# Patient Record
Sex: FEMALE | Race: WHITE | NOT HISPANIC OR LATINO
[De-identification: names, ages, dates, MRNs, and addresses within clinical notes are randomized per-mention and may not be internally consistent; named-entity substitution may affect disease eponyms.]

---

## 2017-10-01 ENCOUNTER — TRANSCRIPTION ENCOUNTER (OUTPATIENT)
Age: 52
End: 2017-10-01

## 2018-02-15 ENCOUNTER — TRANSCRIPTION ENCOUNTER (OUTPATIENT)
Age: 53
End: 2018-02-15

## 2018-02-17 ENCOUNTER — TRANSCRIPTION ENCOUNTER (OUTPATIENT)
Age: 53
End: 2018-02-17

## 2018-05-16 ENCOUNTER — TRANSCRIPTION ENCOUNTER (OUTPATIENT)
Age: 53
End: 2018-05-16

## 2018-06-13 ENCOUNTER — APPOINTMENT (OUTPATIENT)
Dept: HEMATOLOGY ONCOLOGY | Facility: CLINIC | Age: 53
End: 2018-06-13
Payer: COMMERCIAL

## 2018-06-13 VITALS
TEMPERATURE: 98.8 F | DIASTOLIC BLOOD PRESSURE: 81 MMHG | OXYGEN SATURATION: 98 % | RESPIRATION RATE: 16 BRPM | BODY MASS INDEX: 31.36 KG/M2 | SYSTOLIC BLOOD PRESSURE: 121 MMHG | HEIGHT: 71.46 IN | HEART RATE: 83 BPM | WEIGHT: 228.99 LBS

## 2018-06-13 DIAGNOSIS — Z87.891 PERSONAL HISTORY OF NICOTINE DEPENDENCE: ICD-10-CM

## 2018-06-13 DIAGNOSIS — Z80.7 FAMILY HISTORY OF OTHER MALIGNANT NEOPLASMS OF LYMPHOID, HEMATOPOIETIC AND RELATED TISSUES: ICD-10-CM

## 2018-06-13 DIAGNOSIS — Z86.2 PERSONAL HISTORY OF DISEASES OF THE BLOOD AND BLOOD-FORMING ORGANS AND CERTAIN DISORDERS INVOLVING THE IMMUNE MECHANISM: ICD-10-CM

## 2018-06-13 DIAGNOSIS — Z78.9 OTHER SPECIFIED HEALTH STATUS: ICD-10-CM

## 2018-06-13 DIAGNOSIS — Z87.19 PERSONAL HISTORY OF OTHER DISEASES OF THE DIGESTIVE SYSTEM: ICD-10-CM

## 2018-06-13 PROCEDURE — 99245 OFF/OP CONSLTJ NEW/EST HI 55: CPT

## 2018-06-21 ENCOUNTER — OUTPATIENT (OUTPATIENT)
Dept: OUTPATIENT SERVICES | Facility: HOSPITAL | Age: 53
LOS: 1 days | End: 2018-06-21
Payer: COMMERCIAL

## 2018-06-21 PROCEDURE — 78815 PET IMAGE W/CT SKULL-THIGH: CPT

## 2018-06-21 PROCEDURE — 82962 GLUCOSE BLOOD TEST: CPT

## 2018-06-21 PROCEDURE — A9552: CPT

## 2018-06-21 PROCEDURE — 78815 PET IMAGE W/CT SKULL-THIGH: CPT | Mod: 26

## 2018-07-11 ENCOUNTER — APPOINTMENT (OUTPATIENT)
Dept: HEMATOLOGY ONCOLOGY | Facility: CLINIC | Age: 53
End: 2018-07-11
Payer: COMMERCIAL

## 2018-07-11 VITALS
DIASTOLIC BLOOD PRESSURE: 80 MMHG | HEART RATE: 62 BPM | TEMPERATURE: 98.5 F | RESPIRATION RATE: 20 BRPM | BODY MASS INDEX: 31.08 KG/M2 | WEIGHT: 226.99 LBS | OXYGEN SATURATION: 99 % | SYSTOLIC BLOOD PRESSURE: 121 MMHG | HEIGHT: 71.46 IN

## 2018-07-11 PROCEDURE — 99214 OFFICE O/P EST MOD 30 MIN: CPT

## 2018-08-29 ENCOUNTER — APPOINTMENT (OUTPATIENT)
Dept: HEMATOLOGY ONCOLOGY | Facility: CLINIC | Age: 53
End: 2018-08-29
Payer: COMMERCIAL

## 2018-08-29 VITALS
WEIGHT: 223.99 LBS | HEART RATE: 62 BPM | TEMPERATURE: 98.2 F | SYSTOLIC BLOOD PRESSURE: 110 MMHG | HEIGHT: 71.46 IN | RESPIRATION RATE: 20 BRPM | DIASTOLIC BLOOD PRESSURE: 74 MMHG | OXYGEN SATURATION: 99 % | BODY MASS INDEX: 30.67 KG/M2

## 2018-08-29 PROCEDURE — 99214 OFFICE O/P EST MOD 30 MIN: CPT

## 2018-10-23 ENCOUNTER — TRANSCRIPTION ENCOUNTER (OUTPATIENT)
Age: 53
End: 2018-10-23

## 2018-10-24 ENCOUNTER — APPOINTMENT (OUTPATIENT)
Dept: HEMATOLOGY ONCOLOGY | Facility: CLINIC | Age: 53
End: 2018-10-24
Payer: COMMERCIAL

## 2018-10-24 ENCOUNTER — RESULT REVIEW (OUTPATIENT)
Age: 53
End: 2018-10-24

## 2018-10-24 VITALS
WEIGHT: 226 LBS | HEIGHT: 71.46 IN | OXYGEN SATURATION: 100 % | HEART RATE: 71 BPM | RESPIRATION RATE: 16 BRPM | BODY MASS INDEX: 30.95 KG/M2 | DIASTOLIC BLOOD PRESSURE: 66 MMHG | SYSTOLIC BLOOD PRESSURE: 106 MMHG | TEMPERATURE: 98.6 F

## 2018-10-24 PROCEDURE — 99214 OFFICE O/P EST MOD 30 MIN: CPT

## 2018-10-24 RX ORDER — ERGOCALCIFEROL 1.25 MG/1
1.25 MG CAPSULE ORAL
Refills: 0 | Status: COMPLETED | COMMUNITY
End: 2018-10-24

## 2018-12-23 ENCOUNTER — RX RENEWAL (OUTPATIENT)
Age: 53
End: 2018-12-23

## 2019-01-02 ENCOUNTER — APPOINTMENT (OUTPATIENT)
Dept: HEMATOLOGY ONCOLOGY | Facility: CLINIC | Age: 54
End: 2019-01-02
Payer: COMMERCIAL

## 2019-01-02 ENCOUNTER — RESULT REVIEW (OUTPATIENT)
Age: 54
End: 2019-01-02

## 2019-01-02 VITALS
TEMPERATURE: 98.1 F | RESPIRATION RATE: 16 BRPM | HEART RATE: 69 BPM | OXYGEN SATURATION: 98 % | SYSTOLIC BLOOD PRESSURE: 134 MMHG | HEIGHT: 71.46 IN | DIASTOLIC BLOOD PRESSURE: 68 MMHG | WEIGHT: 235 LBS | BODY MASS INDEX: 32.18 KG/M2

## 2019-01-02 PROCEDURE — 99214 OFFICE O/P EST MOD 30 MIN: CPT

## 2019-01-02 NOTE — REVIEW OF SYSTEMS
[Muscle Pain] : muscle pain [Negative] : Allergic/Immunologic [Fatigue] : no fatigue [FreeTextEntry9] : patient is s/p fall at home

## 2019-01-02 NOTE — REASON FOR VISIT
[Follow-Up Visit] : a follow-up visit for [Lymphoma] : lymphoma [FreeTextEntry2] : Waldenstrom Macroglobulinemia, iron deficiency anemia

## 2019-01-02 NOTE — CONSULT LETTER
[Dear  ___] : Dear  [unfilled], [Consult Letter:] : I had the pleasure of evaluating your patient, [unfilled]. [Please see my note below.] : Please see my note below. [Sincerely,] : Sincerely, [FreeTextEntry3] : Adilia Mireles MD\par [facility] [place] at [facility]\par

## 2019-01-02 NOTE — HISTORY OF PRESENT ILLNESS
[de-identified] : 52 year old female who is referred by Dr.Tobe Sanchez for initial consultation for newly diagnosed Waldenstrom macroglobulinemia.  \par She developed intermittent chest pressure and underwent laboratory testing which showed positive immunofixation for double clone of IgM.  \par She does not have the records but saw  underwent  .  She believes IgM is in the 4623-7740 range and the serum viscosity is slightly elevated.\par She denies weight loss, gum bleeding but has h/o chronic bruising, no neuropathy, imbalance or difficulty concentrating.  \par She has observed changes with her vision but didn’t see ophthalmologist.  \par She had h/o gastric bypass and has significant iron deficiency.   [FreeTextEntry1] : s/p IV iron [de-identified] : Patient presents for WM, KALEIGH, and lymphoma.  Patient states she is feeling well.

## 2019-01-02 NOTE — ASSESSMENT
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  \par She presented to PCP with intermitted chest discomfort and was found to have elevated alkaline phosphatase and it was followed by immunofixation which showed biclonal IgM-kappa.  \par Review of labs in [facility] showed elevated ALP dating back to August 2006 with value between 120 to 250 with normal bilirubin. 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  \par last bone density \par T-score -2.5\par Risk factors: postmenopausal, Waldenstrom macroglobulinemia. \par Recommended:\par 1. Vitamin D\par 2. Calcium supplement 500mg\par 3. Weight bearing exercises\par 4. Prolia 6/18 - continue q 6 months. \par \par vit D level- October 2018 \par - start 5 k vit D daily \par \par

## 2019-01-16 ENCOUNTER — TRANSCRIPTION ENCOUNTER (OUTPATIENT)
Age: 54
End: 2019-01-16

## 2019-02-21 ENCOUNTER — RECORD ABSTRACTING (OUTPATIENT)
Age: 54
End: 2019-02-21

## 2019-02-21 DIAGNOSIS — K22.4 DYSKINESIA OF ESOPHAGUS: ICD-10-CM

## 2019-02-21 DIAGNOSIS — Z80.0 FAMILY HISTORY OF MALIGNANT NEOPLASM OF DIGESTIVE ORGANS: ICD-10-CM

## 2019-02-21 DIAGNOSIS — Z83.42 FAMILY HISTORY OF FAMILIAL HYPERCHOLESTEROLEMIA: ICD-10-CM

## 2019-02-21 DIAGNOSIS — E27.9 DISORDER OF ADRENAL GLAND, UNSPECIFIED: ICD-10-CM

## 2019-02-21 DIAGNOSIS — Z82.61 FAMILY HISTORY OF ARTHRITIS: ICD-10-CM

## 2019-02-21 DIAGNOSIS — Z87.11 PERSONAL HISTORY OF PEPTIC ULCER DISEASE: ICD-10-CM

## 2019-02-21 DIAGNOSIS — Z87.19 PERSONAL HISTORY OF OTHER DISEASES OF THE DIGESTIVE SYSTEM: ICD-10-CM

## 2019-02-21 DIAGNOSIS — Z85.72 PERSONAL HISTORY OF NON-HODGKIN LYMPHOMAS: ICD-10-CM

## 2019-02-21 DIAGNOSIS — R74.8 ABNORMAL LEVELS OF OTHER SERUM ENZYMES: ICD-10-CM

## 2019-02-21 DIAGNOSIS — Z82.49 FAMILY HISTORY OF ISCHEMIC HEART DISEASE AND OTHER DISEASES OF THE CIRCULATORY SYSTEM: ICD-10-CM

## 2019-02-21 DIAGNOSIS — Z87.898 PERSONAL HISTORY OF OTHER SPECIFIED CONDITIONS: ICD-10-CM

## 2019-02-21 DIAGNOSIS — R93.89 ABNORMAL FINDINGS ON DIAGNOSTIC IMAGING OF OTHER SPECIFIED BODY STRUCTURES: ICD-10-CM

## 2019-02-21 DIAGNOSIS — Z83.3 FAMILY HISTORY OF DIABETES MELLITUS: ICD-10-CM

## 2019-02-21 LAB — CYTOLOGY CVX/VAG DOC THIN PREP: NORMAL

## 2019-03-04 ENCOUNTER — TRANSCRIPTION ENCOUNTER (OUTPATIENT)
Age: 54
End: 2019-03-04

## 2019-03-06 ENCOUNTER — RESULT REVIEW (OUTPATIENT)
Age: 54
End: 2019-03-06

## 2019-03-06 ENCOUNTER — APPOINTMENT (OUTPATIENT)
Dept: HEMATOLOGY ONCOLOGY | Facility: CLINIC | Age: 54
End: 2019-03-06
Payer: COMMERCIAL

## 2019-03-06 VITALS
BODY MASS INDEX: 33.41 KG/M2 | WEIGHT: 244 LBS | OXYGEN SATURATION: 98 % | HEIGHT: 71.46 IN | SYSTOLIC BLOOD PRESSURE: 124 MMHG | RESPIRATION RATE: 18 BRPM | HEART RATE: 71 BPM | DIASTOLIC BLOOD PRESSURE: 81 MMHG | TEMPERATURE: 98 F

## 2019-03-06 PROCEDURE — 99214 OFFICE O/P EST MOD 30 MIN: CPT

## 2019-03-06 NOTE — CONSULT LETTER
[Dear  ___] : Dear  [unfilled], [Consult Letter:] : I had the pleasure of evaluating your patient, [unfilled]. [Please see my note below.] : Please see my note below. [Sincerely,] : Sincerely, [FreeTextEntry3] : Adilia Mireles MD\par [facility] [place] at [facility]\par

## 2019-03-06 NOTE — HISTORY OF PRESENT ILLNESS
[de-identified] : 52 year old female who is referred by Dr.Tobe Sanchez for initial consultation for newly diagnosed Waldenstrom macroglobulinemia.  \par She developed intermittent chest pressure and underwent laboratory testing which showed positive immunofixation for double clone of IgM.  \par She does not have the records but saw  underwent  .  She believes IgM is in the 6341-8439 range and the serum viscosity is slightly elevated.\par She denies weight loss, gum bleeding but has h/o chronic bruising, no neuropathy, imbalance or difficulty concentrating.  \par She has observed changes with her vision but didn’t see ophthalmologist.  \par She had h/o gastric bypass and has significant iron deficiency.   [FreeTextEntry1] : s/p IV iron [de-identified] : Patient presents for WM, KALEIGH, and lymphoma.  Patient states she is feeling well.

## 2019-03-06 NOTE — ASSESSMENT
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  \par She presented to PCP with intermitted chest discomfort and was found to have elevated alkaline phosphatase and it was followed by immunofixation which showed biclonal IgM-kappa.  \par Review of labs in [facility] showed elevated ALP dating back to August 2006 with value between 120 to 250 with normal bilirubin. \par \par Patient feels well with no symptoms of hyperviscosity\par \par - no HA, imbalance, difficulty with concentration, neuropathy, dyspnea, vertigo, blurry vision, diarrhea\par - s/p IV iron x 4 - Injectafer -ferritin stable above 100\par - PETCT reviewed - no evidence of sarcoid or lymphoma \par - bone marrow results reviewed with patient - 30% BM involvement with MYD 88 mutation present\par - d/w patient at this point she has no indication to start treatment given guidelines of LISA - no B symptoms, hyperviscosity, adenopathy, hepatosplenomegaly, neuropathy, pancytopenia.\par - will monitor counts q 2-3 months \par - Ophthalmology evaluation- done.\par - Shigrix vaccine orders\par \par \par S/p gastric bypass\par - s/p IV iron\par - borderline  Zinc level\par \par Osteoporosis - 2.5.  \par last bone density \par T-score -2.5\par Risk factors: postmenopausal, Waldenstrom macroglobulinemia. \par Recommended:\par 1. Vitamin D\par 2. Calcium supplement 500mg\par 3. Weight bearing exercises\par 4. Prolia 6/18, 12/18 - continue q 6 months. \par \par vit D level- October 2018 \par - start 50 k vit D weekly  \par \par

## 2019-03-07 ENCOUNTER — APPOINTMENT (OUTPATIENT)
Dept: INTERNAL MEDICINE | Facility: CLINIC | Age: 54
End: 2019-03-07
Payer: COMMERCIAL

## 2019-03-07 ENCOUNTER — NON-APPOINTMENT (OUTPATIENT)
Age: 54
End: 2019-03-07

## 2019-03-07 VITALS
SYSTOLIC BLOOD PRESSURE: 120 MMHG | TEMPERATURE: 98.6 F | HEART RATE: 76 BPM | BODY MASS INDEX: 33.88 KG/M2 | DIASTOLIC BLOOD PRESSURE: 78 MMHG | HEIGHT: 71 IN | WEIGHT: 242 LBS | OXYGEN SATURATION: 99 %

## 2019-03-07 DIAGNOSIS — Z00.00 ENCOUNTER FOR GENERAL ADULT MEDICAL EXAMINATION W/OUT ABNORMAL FINDINGS: ICD-10-CM

## 2019-03-07 PROCEDURE — 90732 PPSV23 VACC 2 YRS+ SUBQ/IM: CPT

## 2019-03-07 PROCEDURE — 93000 ELECTROCARDIOGRAM COMPLETE: CPT

## 2019-03-07 PROCEDURE — 99396 PREV VISIT EST AGE 40-64: CPT | Mod: 25

## 2019-03-07 PROCEDURE — G0009: CPT

## 2019-03-07 PROCEDURE — 36415 COLL VENOUS BLD VENIPUNCTURE: CPT

## 2019-03-11 PROBLEM — Z00.00 ENCOUNTER FOR PREVENTIVE HEALTH EXAMINATION: Status: ACTIVE | Noted: 2018-06-13

## 2019-03-11 NOTE — HISTORY OF PRESENT ILLNESS
[FreeTextEntry1] : annual  [de-identified] : Patient is here for annual exam. She is being followed by heme/onc for lymphoma. Patient is currently stable health and happy. She would like to go back on [facility] for weight loss.

## 2019-03-11 NOTE — HISTORY OF PRESENT ILLNESS
[FreeTextEntry1] : annual  [de-identified] : Patient is here for annual exam. She is being followed by heme/onc for lymphoma. Patient is currently stable health and happy. She would like to go back on [facility] for weight loss.

## 2019-03-14 ENCOUNTER — MEDICATION RENEWAL (OUTPATIENT)
Age: 54
End: 2019-03-14

## 2019-03-18 LAB
BASOPHILS # BLD AUTO: 0.07 K/UL
BASOPHILS NFR BLD AUTO: 0.9 %
CHOLEST SERPL-MCNC: 169 MG/DL
CHOLEST/HDLC SERPL: 2.4 RATIO
EOSINOPHIL # BLD AUTO: 0.12 K/UL
EOSINOPHIL NFR BLD AUTO: 1.5 %
HBA1C MFR BLD HPLC: 5.3 %
HCT VFR BLD CALC: 47.2 %
HDLC SERPL-MCNC: 71 MG/DL
HGB BLD-MCNC: 13.7 G/DL
IMM GRANULOCYTES NFR BLD AUTO: 0.2 %
LDLC SERPL CALC-MCNC: 83 MG/DL
LYMPHOCYTES # BLD AUTO: 2.84 K/UL
LYMPHOCYTES NFR BLD AUTO: 34.5 %
MAN DIFF?: NORMAL
MCHC RBC-ENTMCNC: 29 GM/DL
MCHC RBC-ENTMCNC: 29.3 PG
MCV RBC AUTO: 100.9 FL
MONOCYTES # BLD AUTO: 0.67 K/UL
MONOCYTES NFR BLD AUTO: 8.2 %
NEUTROPHILS # BLD AUTO: 4.5 K/UL
NEUTROPHILS NFR BLD AUTO: 54.7 %
PLATELET # BLD AUTO: 348 K/UL
RBC # BLD: 4.68 M/UL
RBC # FLD: 14.2 %
T4 SERPL-MCNC: 5.9 UG/DL
TRIGL SERPL-MCNC: 74 MG/DL
TSH SERPL-ACNC: 1.38 UIU/ML
VIT B12 SERPL-MCNC: 479 PG/ML
WBC # FLD AUTO: 8.22 K/UL

## 2019-05-30 ENCOUNTER — RESULT REVIEW (OUTPATIENT)
Age: 54
End: 2019-05-30

## 2019-05-30 ENCOUNTER — APPOINTMENT (OUTPATIENT)
Dept: HEMATOLOGY ONCOLOGY | Facility: CLINIC | Age: 54
End: 2019-05-30
Payer: COMMERCIAL

## 2019-05-30 VITALS
BODY MASS INDEX: 31.64 KG/M2 | TEMPERATURE: 97.9 F | WEIGHT: 226 LBS | DIASTOLIC BLOOD PRESSURE: 70 MMHG | OXYGEN SATURATION: 97 % | SYSTOLIC BLOOD PRESSURE: 106 MMHG | HEART RATE: 72 BPM | HEIGHT: 71 IN | RESPIRATION RATE: 18 BRPM

## 2019-05-30 PROCEDURE — 99214 OFFICE O/P EST MOD 30 MIN: CPT

## 2019-06-01 NOTE — CONSULT LETTER
[FreeTextEntry3] : Adilia Mireles MD\par [facility] [place] at [facility]\par

## 2019-06-01 NOTE — ASSESSMENT
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  \par She presented to PCP with intermitted chest discomfort and was found to have elevated alkaline phosphatase and it was followed by immunofixation which showed biclonal IgM-kappa.  \par Review of labs in [facility] showed elevated ALP dating back to August 2006 with value between 120 to 250 with normal bilirubin. \par \par Recent increase in fatigue, HA, recurrence of chest pressure. \par Neuropathy - bottom of the feet\par Repeat viscosity, IgM, iron. \par Patient might require initiation of treatment - bendamustine followed by rituximab\par - bone marrow results reviewed with patient - 30% BM involvement with MYD 88 mutation present\par \par \par \par \par S/p gastric bypass\par - s/p IV iron\par - borderline  Zinc level\par \par Osteoporosis - 2.5.  \par last bone density \par T-score -2.5\par Risk factors: postmenopausal, Waldenstrom macroglobulinemia. \par Recommended:\par 1. Vitamin D\par 2. Calcium supplement 500mg\par 3. Weight bearing exercises\par 4. Prolia 6/18, 12/18 - continue q 6 months. \par \par vit D level- October 2018 \par - start 50 k vit D weekly  \par \par

## 2019-06-01 NOTE — HISTORY OF PRESENT ILLNESS
[de-identified] : 52 year old female who is referred by Dr.Tobe Sanchez for initial consultation for newly diagnosed Waldenstrom macroglobulinemia.  \par She developed intermittent chest pressure and underwent laboratory testing which showed positive immunofixation for double clone of IgM.  \par She does not have the records but saw  underwent  .  She believes IgM is in the 6491-0816 range and the serum viscosity is slightly elevated.\par She denies weight loss, gum bleeding but has h/o chronic bruising, no neuropathy, imbalance or difficulty concentrating.  \par She has observed changes with her vision but didn’t see ophthalmologist.  \par She had h/o gastric bypass and has significant iron deficiency.   [FreeTextEntry1] : s/p IV iron [de-identified] : Patient presents for WM, KALEIGH, and lymphoma.  Patient states she is having increased fatigue, where she needs to lay down when she experiences these episodes, also having some associated weakness.

## 2019-06-04 ENCOUNTER — APPOINTMENT (OUTPATIENT)
Dept: HEMATOLOGY ONCOLOGY | Facility: CLINIC | Age: 54
End: 2019-06-04
Payer: COMMERCIAL

## 2019-06-11 ENCOUNTER — OUTPATIENT (OUTPATIENT)
Dept: OUTPATIENT SERVICES | Facility: HOSPITAL | Age: 54
LOS: 1 days | End: 2019-06-11
Payer: COMMERCIAL

## 2019-06-11 PROCEDURE — 82962 GLUCOSE BLOOD TEST: CPT

## 2019-06-11 PROCEDURE — 78815 PET IMAGE W/CT SKULL-THIGH: CPT | Mod: 26

## 2019-06-11 PROCEDURE — 78815 PET IMAGE W/CT SKULL-THIGH: CPT

## 2019-06-11 PROCEDURE — A9552: CPT

## 2019-06-26 ENCOUNTER — RESULT REVIEW (OUTPATIENT)
Age: 54
End: 2019-06-26

## 2019-06-26 ENCOUNTER — APPOINTMENT (OUTPATIENT)
Dept: HEMATOLOGY ONCOLOGY | Facility: CLINIC | Age: 54
End: 2019-06-26
Payer: COMMERCIAL

## 2019-06-26 VITALS
SYSTOLIC BLOOD PRESSURE: 101 MMHG | RESPIRATION RATE: 20 BRPM | WEIGHT: 226 LBS | HEIGHT: 71 IN | TEMPERATURE: 98.3 F | BODY MASS INDEX: 31.64 KG/M2 | OXYGEN SATURATION: 96 % | HEART RATE: 73 BPM | DIASTOLIC BLOOD PRESSURE: 55 MMHG

## 2019-06-26 DIAGNOSIS — E21.3 HYPERPARATHYROIDISM, UNSPECIFIED: ICD-10-CM

## 2019-06-26 PROCEDURE — 99214 OFFICE O/P EST MOD 30 MIN: CPT

## 2019-06-26 NOTE — CONSULT LETTER
[Dear  ___] : Dear  [unfilled], [Please see my note below.] : Please see my note below. [Consult Letter:] : I had the pleasure of evaluating your patient, [unfilled]. [Sincerely,] : Sincerely, [FreeTextEntry3] : Adilia Mireles MD\par [facility] [place] at [facility]\par

## 2019-06-26 NOTE — REVIEW OF SYSTEMS
[Muscle Pain] : muscle pain [Negative] : Allergic/Immunologic [Fatigue] : fatigue [FreeTextEntry9] : patient is s/p fall at home

## 2019-06-26 NOTE — HISTORY OF PRESENT ILLNESS
[de-identified] : 52 year old female who is referred by Dr.Tobe Sanchez for initial consultation for newly diagnosed Waldenstrom macroglobulinemia.  \par She developed intermittent chest pressure and underwent laboratory testing which showed positive immunofixation for double clone of IgM.  \par She does not have the records but saw  underwent  .  She believes IgM is in the 1537-0185 range and the serum viscosity is slightly elevated.\par She denies weight loss, gum bleeding but has h/o chronic bruising, no neuropathy, imbalance or difficulty concentrating.  \par She has observed changes with her vision but didn’t see ophthalmologist.  \par She had h/o gastric bypass and has significant iron deficiency.   [de-identified] : Patient presents for WM, KALEIGH, and lymphoma.  Patient states she is feeling better.  PET CT 6/11/19- showed no evidence of sarcoidosis or lymphoma [FreeTextEntry1] : s/p IV iron

## 2019-06-26 NOTE — ASSESSMENT
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  \par She presented to PCP with intermitted chest discomfort and was found to have elevated alkaline phosphatase and it was followed by immunofixation which showed biclonal IgM-kappa.  \par Review of labs in [facility] showed elevated ALP dating back to August 2006 with value between 120 to 250 with normal bilirubin. \par \par Recent increase in fatigue, HA, recurrence of chest pressure. \par Neuropathy - bottom of the feet\par Repeat viscosity, IgM, iron. \par Patient might require initiation of treatment - bendamustine followed by rituximab\par - bone marrow results reviewed with patient - 30% BM involvement with MYD 88 mutation present\par \par PETCT - ALICIA\par FKL - ratio 3.9, IgM 1623 \par RTC 2 months \par S/p gastric bypass\par - s/p IV iron\par - stable iron level \par \par Osteoporosis - 2.5.  \par last bone density \par T-score -2.5\par Risk factors: postmenopausal, Waldenstrom macroglobulinemia. \par Recommended:\par 1. Vitamin D\par 2. Calcium supplement 500mg\par 3. Weight bearing exercises\par 4. Prolia 6/18, 12/18 - continue q 6 months. \par Hold for now in view of dental implant\par \par vit D level- October 2018 \par - start 50 k vit D weekly \par - continue, continue  \par \par

## 2019-08-29 ENCOUNTER — APPOINTMENT (OUTPATIENT)
Dept: HEMATOLOGY ONCOLOGY | Facility: CLINIC | Age: 54
End: 2019-08-29
Payer: COMMERCIAL

## 2019-09-12 ENCOUNTER — RESULT REVIEW (OUTPATIENT)
Age: 54
End: 2019-09-12

## 2019-09-12 ENCOUNTER — APPOINTMENT (OUTPATIENT)
Dept: HEMATOLOGY ONCOLOGY | Facility: CLINIC | Age: 54
End: 2019-09-12
Payer: COMMERCIAL

## 2019-09-12 VITALS
HEIGHT: 70.98 IN | HEART RATE: 65 BPM | SYSTOLIC BLOOD PRESSURE: 104 MMHG | RESPIRATION RATE: 20 BRPM | OXYGEN SATURATION: 99 % | BODY MASS INDEX: 32.62 KG/M2 | WEIGHT: 232.98 LBS | TEMPERATURE: 98.5 F | DIASTOLIC BLOOD PRESSURE: 67 MMHG

## 2019-09-12 PROCEDURE — 99214 OFFICE O/P EST MOD 30 MIN: CPT

## 2019-09-12 RX ORDER — CHLORHEXIDINE GLUCONATE, 0.12% ORAL RINSE 1.2 MG/ML
0.12 SOLUTION DENTAL
Qty: 1000 | Refills: 5 | Status: COMPLETED | COMMUNITY
Start: 2019-06-26 | End: 2019-09-12

## 2019-09-12 RX ORDER — ERGOCALCIFEROL 1.25 MG/1
1.25 MG CAPSULE ORAL
Refills: 0 | Status: COMPLETED | COMMUNITY
End: 2019-09-12

## 2019-09-12 NOTE — ADDENDUM
[FreeTextEntry1] : Shingles vaccine given 0.5 cc to left deltoid lot number [id number] expiration date 10/29/2021

## 2019-09-12 NOTE — ASSESSMENT
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  \par She presented to PCP with intermitted chest discomfort and was found to have elevated alkaline phosphatase and it was followed by immunofixation which showed biclonal IgM-kappa.  \par Review of labs in [facility] showed elevated ALP dating back to August 2006 with value between 120 to 250 with normal bilirubin. \par \par \par Neuropathy - bottom of the feet.  Feels overall increased over time. Neurology evaluation. MAG antibody ordered.  If progression d/w patient indication for treatment of Waldenstrom\par \par Repeat viscosity, IgM, iron. \par Patient might require initiation of treatment - bendamustine followed by rituximab\par - bone marrow results reviewed with patient - 30% BM involvement with MYD 88 mutation present\par \par PETCT - ALICIA June 2019\par FKL - ratio 3.9, IgM 1237- stable\par RTC 2 months \par \par S/p gastric bypass\par - s/p IV iron\par - stable iron level \par - Zinc, copper- repeat today \par \par Osteoporosis - 2.5.  \par last bone density 4/17 \par T-score -2.5\par Risk factors: postmenopausal, Waldenstrom macroglobulinemia. \par Recommended:\par 1. Vitamin D\par 2. Calcium supplement 500mg\par 3. Weight bearing exercises\par 4. Prolia 6/18, 12/18 - continue q 6 months. \par Hold for now in view of dental implant- restart in November \par \par vit D level- October 2018 \par - start 50 k vit D weekly\par - repeat level  \par \par \par

## 2019-09-12 NOTE — CONSULT LETTER
[Dear  ___] : Dear  [unfilled], [Please see my note below.] : Please see my note below. [Consult Letter:] : I had the pleasure of evaluating your patient, [unfilled]. [Sincerely,] : Sincerely, [FreeTextEntry3] : Adilia Mireles MD\par [facility] [place] at [facility]\par

## 2019-09-12 NOTE — HISTORY OF PRESENT ILLNESS
[de-identified] : 52 year old female who is referred by Dr.Tobe Sanchez for initial consultation for newly diagnosed Waldenstrom macroglobulinemia.  \par She developed intermittent chest pressure and underwent laboratory testing which showed positive immunofixation for double clone of IgM.  \par She does not have the records but saw  underwent  .  She believes IgM is in the 4629-9330 range and the serum viscosity is slightly elevated.\par She denies weight loss, gum bleeding but has h/o chronic bruising, no neuropathy, imbalance or difficulty concentrating.  \par She has observed changes with her vision but didn’t see ophthalmologist.  \par She had h/o gastric bypass and has significant iron deficiency.   [FreeTextEntry1] : s/p IV iron [de-identified] : Patient presents for WM, KALEIGH, and lymphoma.  Patient states she is feeling well.

## 2019-09-27 NOTE — HISTORY OF PRESENT ILLNESS
[de-identified] : Last seen 3/2018 at [facility] poin t an EGD was performed  secondary to heartburn. Path was c/w GERD.   The patient is s/p gastric bypass ( 2007) . EGD / colonoscopy in 12/2015 revealed gastritis in the gastric pouch / diverticulosis and internal hemorrhoids

## 2019-09-30 ENCOUNTER — APPOINTMENT (OUTPATIENT)
Dept: GASTROENTEROLOGY | Facility: CLINIC | Age: 54
End: 2019-09-30
Payer: COMMERCIAL

## 2019-09-30 VITALS
BODY MASS INDEX: 30.34 KG/M2 | HEIGHT: 72 IN | DIASTOLIC BLOOD PRESSURE: 78 MMHG | SYSTOLIC BLOOD PRESSURE: 112 MMHG | HEART RATE: 73 BPM | WEIGHT: 224 LBS

## 2019-09-30 PROCEDURE — 99214 OFFICE O/P EST MOD 30 MIN: CPT | Mod: 25

## 2019-09-30 PROCEDURE — 36415 COLL VENOUS BLD VENIPUNCTURE: CPT

## 2019-09-30 RX ORDER — CHOLECALCIFEROL (VITAMIN D3) 1250 MCG
1.25 MG CAPSULE ORAL
Qty: 12 | Refills: 3 | Status: DISCONTINUED | COMMUNITY
End: 2019-09-30

## 2019-09-30 RX ORDER — ELECTROLYTES/DEXTROSE
32G X 4 MM SOLUTION, ORAL ORAL
Qty: 90 | Refills: 3 | Status: DISCONTINUED | COMMUNITY
Start: 2018-12-23 | End: 2019-09-30

## 2019-09-30 RX ORDER — METOPROLOL TARTRATE 75 MG/1
TABLET, FILM COATED ORAL
Refills: 0 | Status: DISCONTINUED | COMMUNITY
End: 2019-09-30

## 2019-09-30 RX ORDER — PANTOPRAZOLE 40 MG/1
40 TABLET, DELAYED RELEASE ORAL
Refills: 0 | Status: DISCONTINUED | COMMUNITY
End: 2019-09-30

## 2019-09-30 RX ORDER — LIRAGLUTIDE 6 MG/ML
18 INJECTION, SOLUTION SUBCUTANEOUS DAILY
Qty: 18 | Refills: 3 | Status: DISCONTINUED | COMMUNITY
Start: 2017-11-02 | End: 2019-09-30

## 2019-09-30 RX ORDER — PANTOPRAZOLE SODIUM 40 MG/1
40 TABLET, DELAYED RELEASE ORAL
Refills: 0 | Status: DISCONTINUED | COMMUNITY
End: 2019-09-30

## 2019-09-30 RX ORDER — ZOSTER VACCINE RECOMBINANT, ADJUVANTED 50 MCG/0.5
50 KIT INTRAMUSCULAR
Qty: 1 | Refills: 1 | Status: DISCONTINUED | COMMUNITY
Start: 2019-03-06 | End: 2019-09-30

## 2019-09-30 RX ORDER — FERROUS SULFATE 325(65) MG
TABLET ORAL
Refills: 0 | Status: DISCONTINUED | COMMUNITY
End: 2019-09-30

## 2019-09-30 RX ORDER — MELOXICAM 15 MG/1
TABLET ORAL
Refills: 0 | Status: DISCONTINUED | COMMUNITY
End: 2019-09-30

## 2019-09-30 RX ORDER — ASCORBIC ACID 500 MG
TABLET ORAL
Refills: 0 | Status: DISCONTINUED | COMMUNITY
End: 2019-09-30

## 2019-09-30 RX ORDER — PNV NO.95/FERROUS FUM/FOLIC AC 28MG-0.8MG
TABLET ORAL
Refills: 0 | Status: DISCONTINUED | COMMUNITY
End: 2019-09-30

## 2019-09-30 NOTE — PHYSICAL EXAM
[General Appearance - In No Acute Distress] : in no acute distress [General Appearance - Alert] : alert [Outer Ear] : the ears and nose were normal in appearance [Sclera] : the sclera and conjunctiva were normal [Neck Appearance] : the appearance of the neck was normal [] : no respiratory distress [Abdomen Soft] : soft [FreeTextEntry1] : deferred [Abnormal Walk] : normal gait [Skin Color & Pigmentation] : normal skin color and pigmentation [No Focal Deficits] : no focal deficits [Oriented To Time, Place, And Person] : oriented to person, place, and time

## 2019-09-30 NOTE — HISTORY OF PRESENT ILLNESS
[de-identified] : Presents c/o intermittent heartburn for the past several months  ( off PPI at present). Additionally c/o greater than  6 months of watery diarrhea ( occasionally post prandial / occasionally nocturnal) alternating with constipation. No recent travel / antibiotics / sick contact. CBC / CMET from mid September were reviewed by my were unremarkable. Denies melena, hematemesis, unexpected weight loss. Last seen 3/2018 at which point an EGD was performed  secondary to heartburn. Path was c/w GERD.   The patient is s/p gastric bypass ( 2007) . EGD / colonoscopy in 12/2015 revealed gastritis in the gastric pouch / diverticulosis and internal hemorrhoids

## 2019-09-30 NOTE — ASSESSMENT
[FreeTextEntry1] : 1. GERD: dietary and lifestyle modification. PPI prescribed\par \par 2. Diarrhea with occasional constipation: Probable IBS. Stool studies requested. Colonoscopy with random biopsies planned. Stool / food diary suggested. Labs ordered

## 2019-10-01 ENCOUNTER — OTHER (OUTPATIENT)
Age: 54
End: 2019-10-01

## 2019-10-02 ENCOUNTER — OTHER (OUTPATIENT)
Age: 54
End: 2019-10-02

## 2019-10-02 LAB
ALBUMIN SERPL ELPH-MCNC: 4.3 G/DL
ALP BLD-CCNC: 156 U/L
ALT SERPL-CCNC: 49 U/L
ANION GAP SERPL CALC-SCNC: 9 MMOL/L
AST SERPL-CCNC: 37 U/L
BASOPHILS # BLD AUTO: 0.06 K/UL
BASOPHILS NFR BLD AUTO: 0.9 %
BILIRUB DIRECT SERPL-MCNC: 0.1 MG/DL
BILIRUB INDIRECT SERPL-MCNC: 0.3 MG/DL
BILIRUB SERPL-MCNC: 0.4 MG/DL
BUN SERPL-MCNC: 12 MG/DL
CALCIUM SERPL-MCNC: 9.5 MG/DL
CHLORIDE SERPL-SCNC: 105 MMOL/L
CO2 SERPL-SCNC: 27 MMOL/L
CREAT SERPL-MCNC: 0.62 MG/DL
CRP SERPL-MCNC: 0.72 MG/DL
EOSINOPHIL # BLD AUTO: 0.09 K/UL
EOSINOPHIL NFR BLD AUTO: 1.3 %
ERYTHROCYTE [SEDIMENTATION RATE] IN BLOOD BY WESTERGREN METHOD: 64 MM/HR
FOLATE SERPL-MCNC: 9.6 NG/ML
GLUCOSE SERPL-MCNC: 89 MG/DL
HBV CORE IGG+IGM SER QL: NONREACTIVE
HBV CORE IGM SER QL: NONREACTIVE
HBV SURFACE AB SER QL: REACTIVE
HBV SURFACE AG SER QL: NONREACTIVE
HCT VFR BLD CALC: 41.6 %
HCV AB SER QL: NONREACTIVE
HCV S/CO RATIO: 0.1 S/CO
HEPATITIS A IGG ANTIBODY: NONREACTIVE
HGB BLD-MCNC: 13.2 G/DL
IMM GRANULOCYTES NFR BLD AUTO: 0.3 %
LYMPHOCYTES # BLD AUTO: 2.28 K/UL
LYMPHOCYTES NFR BLD AUTO: 33.2 %
MAN DIFF?: NORMAL
MCHC RBC-ENTMCNC: 30.3 PG
MCHC RBC-ENTMCNC: 31.7 GM/DL
MCV RBC AUTO: 95.4 FL
MONOCYTES # BLD AUTO: 0.62 K/UL
MONOCYTES NFR BLD AUTO: 9 %
NEUTROPHILS # BLD AUTO: 3.79 K/UL
NEUTROPHILS NFR BLD AUTO: 55.3 %
PLATELET # BLD AUTO: 328 K/UL
POTASSIUM SERPL-SCNC: 4.6 MMOL/L
PROT SERPL-MCNC: 7.8 G/DL
RBC # BLD: 4.36 M/UL
RBC # FLD: 13.6 %
SODIUM SERPL-SCNC: 141 MMOL/L
TSH SERPL-ACNC: 1.52 UIU/ML
VIT B12 SERPL-MCNC: 400 PG/ML
WBC # FLD AUTO: 6.86 K/UL

## 2019-10-03 LAB
ANA SER IF-ACNC: NEGATIVE
GLIADIN IGA SER QL: <5 UNITS
GLIADIN IGG SER QL: <5 UNITS
GLIADIN PEPTIDE IGA SER-ACNC: NEGATIVE
GLIADIN PEPTIDE IGG SER-ACNC: NEGATIVE
MITOCHONDRIA AB SER IF-ACNC: NORMAL
TTG IGA SER IA-ACNC: <1.2 U/ML
TTG IGA SER-ACNC: NEGATIVE
TTG IGG SER IA-ACNC: 1.3 U/ML
TTG IGG SER IA-ACNC: NEGATIVE

## 2019-10-11 ENCOUNTER — RESULT REVIEW (OUTPATIENT)
Age: 54
End: 2019-10-11

## 2019-10-14 ENCOUNTER — OTHER (OUTPATIENT)
Age: 54
End: 2019-10-14

## 2019-10-14 ENCOUNTER — RESULT REVIEW (OUTPATIENT)
Age: 54
End: 2019-10-14

## 2019-10-15 LAB
BACTERIA STL CULT: NORMAL
CALPROTECTIN FECAL: 25 UG/G
G LAMBLIA AG STL QL: NORMAL

## 2019-10-16 ENCOUNTER — RESULT REVIEW (OUTPATIENT)
Age: 54
End: 2019-10-16

## 2019-10-16 LAB
LACTOFERRIN STL-MCNC: 5.95
PANCREATIC ELASTASE, FECAL: 350

## 2019-10-17 ENCOUNTER — APPOINTMENT (OUTPATIENT)
Dept: GASTROENTEROLOGY | Facility: HOSPITAL | Age: 54
End: 2019-10-17

## 2019-10-21 LAB — DEPRECATED O AND P PREP STL: NORMAL

## 2019-10-24 ENCOUNTER — TRANSCRIPTION ENCOUNTER (OUTPATIENT)
Age: 54
End: 2019-10-24

## 2019-11-13 ENCOUNTER — APPOINTMENT (OUTPATIENT)
Dept: HEMATOLOGY ONCOLOGY | Facility: CLINIC | Age: 54
End: 2019-11-13
Payer: COMMERCIAL

## 2019-12-09 ENCOUNTER — APPOINTMENT (OUTPATIENT)
Dept: NEUROLOGY | Facility: CLINIC | Age: 54
End: 2019-12-09

## 2019-12-11 ENCOUNTER — RESULT REVIEW (OUTPATIENT)
Age: 54
End: 2019-12-11

## 2019-12-11 ENCOUNTER — APPOINTMENT (OUTPATIENT)
Dept: HEMATOLOGY ONCOLOGY | Facility: CLINIC | Age: 54
End: 2019-12-11
Payer: COMMERCIAL

## 2019-12-11 VITALS
HEIGHT: 72 IN | TEMPERATURE: 98.4 F | BODY MASS INDEX: 32.78 KG/M2 | RESPIRATION RATE: 20 BRPM | SYSTOLIC BLOOD PRESSURE: 106 MMHG | WEIGHT: 242 LBS | OXYGEN SATURATION: 98 % | DIASTOLIC BLOOD PRESSURE: 71 MMHG | HEART RATE: 62 BPM

## 2019-12-11 DIAGNOSIS — K52.9 NONINFECTIVE GASTROENTERITIS AND COLITIS, UNSPECIFIED: ICD-10-CM

## 2019-12-11 PROCEDURE — 99214 OFFICE O/P EST MOD 30 MIN: CPT

## 2019-12-11 NOTE — HISTORY OF PRESENT ILLNESS
[de-identified] : 52 year old female who is referred by Dr.Tobe Sanchez for initial consultation for newly diagnosed Waldenstrom macroglobulinemia.  \par She developed intermittent chest pressure and underwent laboratory testing which showed positive immunofixation for double clone of IgM.  \par She does not have the records but saw  underwent  .  She believes IgM is in the 5005-8213 range and the serum viscosity is slightly elevated.\par She denies weight loss, gum bleeding but has h/o chronic bruising, no neuropathy, imbalance or difficulty concentrating.  \par She has observed changes with her vision but didn’t see ophthalmologist.  \par She had h/o gastric bypass and has significant iron deficiency.   [FreeTextEntry1] : s/p IV iron [de-identified] : Patient presents for WM, KALEIGH, and lymphoma.  Patient states she is feeling well.

## 2019-12-11 NOTE — CONSULT LETTER
[Dear  ___] : Dear  [unfilled], [Please see my note below.] : Please see my note below. [Consult Letter:] : I had the pleasure of evaluating your patient, [unfilled]. [Sincerely,] : Sincerely, [FreeTextEntry3] : Adilia Mireles MD\par [facility] [place] at [facility]\par

## 2019-12-11 NOTE — ASSESSMENT
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  \par She presented to PCP with intermitted chest discomfort and was found to have elevated alkaline phosphatase and it was followed by immunofixation which showed biclonal IgM-kappa.  \par Review of labs in [facility] showed elevated ALP dating back to August 2006 with value between 120 to 250 with normal bilirubin. \par \par \par Neuropathy - bottom of the feet.  Feels overall increased over time. Neurology evaluation. MAG antibody ordered.  If progression d/w patient indication for treatment of Waldenstrom\par \par Repeat viscosity, IgM, iron. \par Patient might require initiation of treatment - bendamustine followed by rituximab\par - bone marrow results reviewed with patient - 30% BM involvement with MYD 88 mutation present\par \par PETCT - ALICIA June 2019\par FKL - ratio 4.12, IgM 1432- stable\par RTC 3 months \par MRI lumbar spine reviewed\par \par MAG- negative \par \par S/p gastric bypass\par - s/p IV iron\par - stable iron level \par - Zinc- borderline level, copper- repeat today \par \par Osteoporosis - 2.5.  \par last bone density 10/19 \par T-score -2.8\par Risk factors: postmenopausal, Waldenstrom macroglobulinemia. \par Recommended:\par 1. Vitamin D\par 2. Calcium supplement 500mg\par 3. Weight bearing exercises\par 4. Prolia 6/18, 12/18- JUne postponed b/o implants,  - continue q 6 months. \par Hold for now in view of dental implant- restart in November \par \par vit D level- October 2019 \par - start 50 k vit D weekly\par - repeat level  \par \par \par

## 2019-12-27 ENCOUNTER — RESULT REVIEW (OUTPATIENT)
Age: 54
End: 2019-12-27

## 2019-12-27 ENCOUNTER — APPOINTMENT (OUTPATIENT)
Dept: HEMATOLOGY ONCOLOGY | Facility: CLINIC | Age: 54
End: 2019-12-27
Payer: COMMERCIAL

## 2019-12-27 VITALS
WEIGHT: 245.99 LBS | RESPIRATION RATE: 18 BRPM | DIASTOLIC BLOOD PRESSURE: 72 MMHG | SYSTOLIC BLOOD PRESSURE: 133 MMHG | HEIGHT: 72 IN | BODY MASS INDEX: 33.32 KG/M2 | HEART RATE: 69 BPM | TEMPERATURE: 97.6 F | OXYGEN SATURATION: 97 %

## 2019-12-27 DIAGNOSIS — R04.0 EPISTAXIS: ICD-10-CM

## 2019-12-27 PROCEDURE — 99214 OFFICE O/P EST MOD 30 MIN: CPT

## 2019-12-27 NOTE — HISTORY OF PRESENT ILLNESS
[de-identified] : 52 year old female who is referred by Dr.Tobe Sanchez for initial consultation for newly diagnosed Waldenstrom macroglobulinemia.  \par She developed intermittent chest pressure and underwent laboratory testing which showed positive immunofixation for double clone of IgM.  \par She does not have the records but saw  underwent  .  She believes IgM is in the 2719-5433 range and the serum viscosity is slightly elevated.\par She denies weight loss, gum bleeding but has h/o chronic bruising, no neuropathy, imbalance or difficulty concentrating.  \par She has observed changes with her vision but didn’t see ophthalmologist.  \par She had h/o gastric bypass and has significant iron deficiency.   [de-identified] : Patient presents for WM, KALEIGH, and lymphoma.  Patient presents today with epistaxis from right nostril x 3. [FreeTextEntry1] : s/p IV iron

## 2019-12-27 NOTE — CONSULT LETTER
[Dear  ___] : Dear  [unfilled], [Consult Letter:] : I had the pleasure of evaluating your patient, [unfilled]. [Please see my note below.] : Please see my note below. [Sincerely,] : Sincerely, [FreeTextEntry3] : Adilia Mireles MD\par [facility] [place] at [facility]\par

## 2019-12-27 NOTE — ASSESSMENT
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  \par She presented to PCP with intermitted chest discomfort and was found to have elevated alkaline phosphatase and it was followed by immunofixation which showed biclonal IgM-kappa.  \par Review of labs in [facility] showed elevated ALP dating back to August 2006 with value between 120 to 250 with normal bilirubin. \par Neuropathy - bottom of the feet.  Feels overall increased over time. Neurology evaluation. MAG antibody ordered.  If progression d/w patient indication for treatment of Waldenstrom\par \par Repeat viscosity, IgM, iron. \par Patient might require initiation of treatment - bendamustine followed by rituximab\par - bone marrow results reviewed with patient - 30% BM involvement with MYD 88 mutation present\par \par PETCT - ALICIA June 2019\par FKL - ratio 4.12, IgM 1434- stable\par Presents today with b/l epistaxis - check CBC, obtain ENT evaluation\par CBC - stable \par \par MAG- negative \par \par S/p gastric bypass\par - s/p IV iron\par - stable iron level \par - Zinc- borderline level, copper- repeat today \par \par Osteoporosis - 2.5.  \par last bone density 10/19 \par T-score -2.8\par Risk factors: postmenopausal, Waldenstrom macroglobulinemia. \par Recommended:\par 1. Vitamin D\par 2. Calcium supplement 500mg\par 3. Weight bearing exercises\par 4. Prolia 6/18, 12/18- JUne postponed b/o implants,  - continue q 6 months. \par Hold for now in view of dental implant- restart in November \par \par vit D level- October 2019 \par - start 50 k vit D weekly\par - repeat level  \par \par \par

## 2019-12-30 ENCOUNTER — INBOUND DOCUMENT (OUTPATIENT)
Age: 54
End: 2019-12-30

## 2020-03-04 ENCOUNTER — RESULT REVIEW (OUTPATIENT)
Age: 55
End: 2020-03-04

## 2020-03-04 ENCOUNTER — APPOINTMENT (OUTPATIENT)
Dept: HEMATOLOGY ONCOLOGY | Facility: CLINIC | Age: 55
End: 2020-03-04
Payer: COMMERCIAL

## 2020-03-04 VITALS
WEIGHT: 232 LBS | BODY MASS INDEX: 31.42 KG/M2 | HEART RATE: 67 BPM | DIASTOLIC BLOOD PRESSURE: 65 MMHG | RESPIRATION RATE: 18 BRPM | SYSTOLIC BLOOD PRESSURE: 99 MMHG | TEMPERATURE: 97.8 F | OXYGEN SATURATION: 97 % | HEIGHT: 72 IN

## 2020-03-04 PROCEDURE — 99214 OFFICE O/P EST MOD 30 MIN: CPT

## 2020-03-04 NOTE — REVIEW OF SYSTEMS
[Fatigue] : fatigue [Negative] : Allergic/Immunologic [Muscle Pain] : no muscle pain [FreeTextEntry2] : muscle cramping in legs

## 2020-03-04 NOTE — ASSESSMENT
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  \par She presented to PCP with intermitted chest discomfort and was found to have elevated alkaline phosphatase and it was followed by immunofixation which showed biclonal IgM-kappa.  \par Review of labs in [facility] showed elevated ALP dating back to August 2006 with value between 120 to 250 with normal bilirubin. \par Neuropathy - bottom of the feet.  Feels overall increased over time. Neurology evaluation. MAG antibody ordered.  If progression d/w patient indication for treatment of Waldenstrom\par \par Repeat viscosity, IgM, iron. \par Patient might require initiation of treatment - bendamustine followed by rituximab\par - bone marrow results reviewed with patient - 30% BM involvement with MYD 88 mutation present\par \par PETCT - ALICIA June 2019\par FKL - ratio 4.12, IgM 1634- stable\par \par S/p prolonged epistaxis- check iron level \par MAG- negative \par Muscle cramping - stretching exercises \par \par S/p gastric bypass\par - s/p IV iron\par - stable iron level \par - Zinc- borderline level, copper- repeat today \par \par Osteoporosis - 2.5.  \par last bone density 10/19 \par T-score -2.8\par Risk factors: postmenopausal, Waldenstrom macroglobulinemia. \par Recommended:\par 1. Vitamin D\par 2. Calcium supplement 500mg\par 3. Weight bearing exercises\par 4. Prolia 6/18, 12/18, Dec 2019- JUne postponed b/o implants,  - continue q 6 months. \par \par \par vit D level- October 2019 \par - start 50 k vit D weekly\par - repeat level today \par \par \par

## 2020-03-04 NOTE — HISTORY OF PRESENT ILLNESS
[de-identified] : 52 year old female who is referred by Dr.Tobe Sanchez for initial consultation for newly diagnosed Waldenstrom macroglobulinemia.  \par She developed intermittent chest pressure and underwent laboratory testing which showed positive immunofixation for double clone of IgM.  \par She does not have the records but saw  underwent  .  She believes IgM is in the 2221-0454 range and the serum viscosity is slightly elevated.\par She denies weight loss, gum bleeding but has h/o chronic bruising, no neuropathy, imbalance or difficulty concentrating.  \par She has observed changes with her vision but didn’t see ophthalmologist.  \par She had h/o gastric bypass and has significant iron deficiency.   [FreeTextEntry1] : s/p IV iron [de-identified] : Patient presents for WM, KALEIGH, and lymphoma.  S/p nasal cauterization and packing- ER for bilateral epistaxis- all resolved back in Dec.  Doing well now feeling fatigue has many life events going on .  Also having some muscle cramping her legs taking tonic water

## 2020-03-04 NOTE — CONSULT LETTER
[Dear  ___] : Dear  [unfilled], [Please see my note below.] : Please see my note below. [Consult Letter:] : I had the pleasure of evaluating your patient, [unfilled]. [Sincerely,] : Sincerely, [FreeTextEntry3] : Adilia Mireles MD\par [facility] [place] at [facility]\par

## 2020-04-26 ENCOUNTER — MESSAGE (OUTPATIENT)
Age: 55
End: 2020-04-26

## 2020-05-03 ENCOUNTER — APPOINTMENT (OUTPATIENT)
Age: 55
End: 2020-05-03

## 2020-05-04 LAB
SARS-COV-2 IGG SERPL IA-ACNC: <0.1 INDEX
SARS-COV-2 IGG SERPL QL IA: NEGATIVE

## 2020-06-04 ENCOUNTER — RESULT REVIEW (OUTPATIENT)
Age: 55
End: 2020-06-04

## 2020-06-04 ENCOUNTER — APPOINTMENT (OUTPATIENT)
Dept: HEMATOLOGY ONCOLOGY | Facility: CLINIC | Age: 55
End: 2020-06-04
Payer: COMMERCIAL

## 2020-06-04 VITALS
OXYGEN SATURATION: 97 % | TEMPERATURE: 99.5 F | BODY MASS INDEX: 30.74 KG/M2 | RESPIRATION RATE: 18 BRPM | HEIGHT: 72 IN | HEART RATE: 75 BPM | DIASTOLIC BLOOD PRESSURE: 68 MMHG | WEIGHT: 226.99 LBS | SYSTOLIC BLOOD PRESSURE: 110 MMHG

## 2020-06-04 PROCEDURE — 99214 OFFICE O/P EST MOD 30 MIN: CPT

## 2020-06-04 NOTE — CONSULT LETTER
[Consult Letter:] : I had the pleasure of evaluating your patient, [unfilled]. [Dear  ___] : Dear  [unfilled], [Sincerely,] : Sincerely, [Please see my note below.] : Please see my note below. [FreeTextEntry3] : Adilia Mireles MD\par [facility] [place] at [facility]\par

## 2020-06-04 NOTE — ASSESSMENT
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  \par She presented to PCP with intermitted chest discomfort and was found to have elevated alkaline phosphatase and it was followed by immunofixation which showed biclonal IgM-kappa.  \par Review of labs in [facility] showed elevated ALP dating back to August 2006 with value between 120 to 250 with normal bilirubin. \par Neuropathy - worsening, toes to above ankles. Neurology evaluation. MAG antibody ordered.  If progression d/w patient indication for treatment of Waldenstrom\par \par Repeat viscosity, IgM, iron.\par Increased neuropathy - LE stocking distribution \par Patient might require initiation of treatment - bendamustine followed by rituximab\par - bone marrow results reviewed with patient - 30% BM involvement with MYD 88 mutation present\par \par PETCT - ALICIA June 2019\par FKL - ratio 4.12, IgM 1634- stable\par \par S/p prolonged epistaxis Dec 2019- check iron level \par MAG- negative \par Muscle cramping - stretching exercises \par \par S/p gastric bypass\par - s/p IV iron\par - stable iron level \par - Zinc- borderline level, copper- repeat today \par \par Osteoporosis - 2.5.  \par last bone density 10/19 \par T-score -2.8\par Risk factors: postmenopausal, Waldenstrom macroglobulinemia. \par Recommended:\par 1. Vitamin D\par 2. Calcium supplement 500mg\par 3. Weight bearing exercises\par 4. Prolia 6/18, 12/18, Dec 2019- June postponed b/o implants,  - continue q 6 months. \par \par vit D level- 24 Dec 2019 \par - start 50 k vit D weekly\par - repeat level today

## 2020-06-04 NOTE — REVIEW OF SYSTEMS
[Fatigue] : fatigue [Negative] : Heme/Lymph [Palpitations] : palpitations [Muscle Pain] : no muscle pain [FreeTextEntry5] : intermittent [FreeTextEntry9] : muscle cramping bilateral legs

## 2020-06-04 NOTE — HISTORY OF PRESENT ILLNESS
[de-identified] : 52 year old female who is referred by Dr.Tobe Sanchez for initial consultation for newly diagnosed Waldenstrom macroglobulinemia.  \par She developed intermittent chest pressure and underwent laboratory testing which showed positive immunofixation for double clone of IgM.  \par She does not have the records but saw  underwent  .  She believes IgM is in the 5991-0367 range and the serum viscosity is slightly elevated.\par She denies weight loss, gum bleeding but has h/o chronic bruising, no neuropathy, imbalance or difficulty concentrating.  \par She has observed changes with her vision but didn’t see ophthalmologist.  \par She had h/o gastric bypass and has significant iron deficiency.   [FreeTextEntry1] : s/p IV iron [de-identified] : Patient presents for WM, KALEIGH, and lymphoma.  In the past few weeks she had 3 episodes of SVT, longest one lasted approximately 20 minutes, resolved without intervention; did not contact cardiologist (Dr. Rodriguez), denies SOB, feeling dizzy/lightheaded.  Numbness to bilateral feet more severe, goes from toes to above ankles; left worse than right. Had one recent episode of significant muscle cramping to bilateral lower extremities.

## 2020-06-09 ENCOUNTER — APPOINTMENT (OUTPATIENT)
Dept: CARDIOLOGY | Facility: CLINIC | Age: 55
End: 2020-06-09
Payer: COMMERCIAL

## 2020-06-16 ENCOUNTER — APPOINTMENT (OUTPATIENT)
Dept: CARDIOLOGY | Facility: CLINIC | Age: 55
End: 2020-06-16
Payer: COMMERCIAL

## 2020-06-16 ENCOUNTER — NON-APPOINTMENT (OUTPATIENT)
Age: 55
End: 2020-06-16

## 2020-06-16 VITALS
DIASTOLIC BLOOD PRESSURE: 74 MMHG | BODY MASS INDEX: 31.29 KG/M2 | SYSTOLIC BLOOD PRESSURE: 110 MMHG | HEART RATE: 68 BPM | WEIGHT: 231 LBS | HEIGHT: 72 IN

## 2020-06-16 DIAGNOSIS — R00.2 PALPITATIONS: ICD-10-CM

## 2020-06-16 DIAGNOSIS — I47.1 SUPRAVENTRICULAR TACHYCARDIA: ICD-10-CM

## 2020-06-16 PROCEDURE — 93000 ELECTROCARDIOGRAM COMPLETE: CPT | Mod: 59

## 2020-06-16 PROCEDURE — 0296T: CPT

## 2020-06-16 PROCEDURE — 99214 OFFICE O/P EST MOD 30 MIN: CPT

## 2020-06-26 ENCOUNTER — RESULT REVIEW (OUTPATIENT)
Age: 55
End: 2020-06-26

## 2020-07-01 PROCEDURE — 0298T: CPT

## 2020-07-23 ENCOUNTER — APPOINTMENT (OUTPATIENT)
Dept: HEMATOLOGY ONCOLOGY | Facility: CLINIC | Age: 55
End: 2020-07-23
Payer: COMMERCIAL

## 2020-07-23 ENCOUNTER — RESULT REVIEW (OUTPATIENT)
Age: 55
End: 2020-07-23

## 2020-07-23 VITALS
BODY MASS INDEX: 30.48 KG/M2 | OXYGEN SATURATION: 99 % | WEIGHT: 225 LBS | SYSTOLIC BLOOD PRESSURE: 91 MMHG | TEMPERATURE: 98.2 F | DIASTOLIC BLOOD PRESSURE: 58 MMHG | RESPIRATION RATE: 18 BRPM | HEIGHT: 72 IN | HEART RATE: 65 BPM

## 2020-07-23 DIAGNOSIS — S82.892A OTHER FRACTURE OF LEFT LOWER LEG, INITIAL ENCOUNTER FOR CLOSED FRACTURE: ICD-10-CM

## 2020-07-23 DIAGNOSIS — E60 DIETARY ZINC DEFICIENCY: ICD-10-CM

## 2020-07-23 PROCEDURE — 99215 OFFICE O/P EST HI 40 MIN: CPT

## 2020-07-23 NOTE — HISTORY OF PRESENT ILLNESS
[de-identified] : 52 year old female who is referred by Dr.Tobe Sanchez for initial consultation for newly diagnosed Waldenstrom macroglobulinemia.  \par She developed intermittent chest pressure and underwent laboratory testing which showed positive immunofixation for double clone of IgM.  \par She does not have the records but saw  underwent  .  She believes IgM is in the 6131-8616 range and the serum viscosity is slightly elevated.\par She denies weight loss, gum bleeding but has h/o chronic bruising, no neuropathy, imbalance or difficulty concentrating.  \par She has observed changes with her vision but didn’t see ophthalmologist.  \par She had h/o gastric bypass and has significant iron deficiency.   [FreeTextEntry1] : s/p IV iron [de-identified] : Patient presents for WM, KALEIGH, and lymphoma.  In the past few weeks she had 3 episodes of SVT, longest one lasted approximately 20 minutes, resolved without intervention; did not contact cardiologist (Dr. Rodriguez), denies SOB, feeling dizzy/lightheaded.  Numbness to bilateral feet more severe, goes from toes to above ankles; left worse than right. Had one recent episode of significant muscle cramping to bilateral lower extremities.

## 2020-07-23 NOTE — CONSULT LETTER
[Dear  ___] : Dear  [unfilled], [Sincerely,] : Sincerely, [Please see my note below.] : Please see my note below. [Consult Letter:] : I had the pleasure of evaluating your patient, [unfilled]. [FreeTextEntry3] : Adilia Mireles MD\par [facility] [place] at [facility]\par

## 2020-07-23 NOTE — REASON FOR VISIT
[Lymphoma] : lymphoma [Follow-Up Visit] : a follow-up visit for [FreeTextEntry2] : Waldenstrom Macroglobulinemia, iron deficiency anemia

## 2020-07-23 NOTE — REVIEW OF SYSTEMS
[Palpitations] : palpitations [Fatigue] : fatigue [Negative] : Heme/Lymph [Muscle Pain] : no muscle pain [FreeTextEntry5] : intermittent [FreeTextEntry9] : muscle cramping bilateral legs

## 2020-07-23 NOTE — ASSESSMENT
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  \par \par She presented to PCP with intermitted chest discomfort and was found to have elevated alkaline phosphatase and it was followed by immunofixation which showed biclonal IgM-kappa.  \par Review of labs in [facility] showed elevated ALP dating back to August 2006 with value between 120 to 250 with normal bilirubin. \par Neuropathy - worsening, toes to above ankles. Neurology evaluation. MAG negative . \par \par Viscosity -2 , IgM 1555.\par Increased neuropathy - LE stocking distribution. Recent fall, sustained fracture left ankle. Obtain MRI. \par Plan for consulatation - Gerald Staples at [facility]\par - bone marrow results reviewed with patient - 30% BM involvement with MYD 88 mutation present 3/2018\par \par PETCT - ALICIA June 2019, repeat now \par FKL - ratio 4.12, IgM 1634- stable\par \par S/p prolonged epistaxis Dec 2019- check iron level, likely related to Waldenstrom\par  \par MAG- negative \par Muscle cramping - stretching exercises \par \par S/p gastric bypass\par - s/p IV iron\par - stable iron level \par - Zinc- borderline level, copper- \par - Zinc Supplement \par \par Osteoporosis - 2.5.  \par last bone density 10/19 \par T-score -2.8\par Risk factors: postmenopausal, Waldenstrom macroglobulinemia. \par Recommended:\par 1. Vitamin D\par 2. Calcium supplement 500mg\par 3. Weight bearing exercises\par 4. Prolia 6/18, 12/18, Dec 2019- June postponed b/o implants,  - continue q 6 months. \par \par vit D level- 24 Dec 2019 \par - start 50 k vit D weekly\par - repeat level today

## 2020-07-27 ENCOUNTER — RESULT REVIEW (OUTPATIENT)
Age: 55
End: 2020-07-27

## 2020-07-30 ENCOUNTER — OUTPATIENT (OUTPATIENT)
Dept: OUTPATIENT SERVICES | Facility: HOSPITAL | Age: 55
LOS: 1 days | End: 2020-07-30
Payer: COMMERCIAL

## 2020-07-30 PROCEDURE — 78815 PET IMAGE W/CT SKULL-THIGH: CPT | Mod: 26

## 2020-07-30 PROCEDURE — 78815 PET IMAGE W/CT SKULL-THIGH: CPT

## 2020-07-30 PROCEDURE — 82962 GLUCOSE BLOOD TEST: CPT

## 2020-07-30 PROCEDURE — A9552: CPT

## 2020-09-15 ENCOUNTER — TRANSCRIPTION ENCOUNTER (OUTPATIENT)
Age: 55
End: 2020-09-15

## 2020-09-17 ENCOUNTER — APPOINTMENT (OUTPATIENT)
Dept: NEUROLOGY | Facility: CLINIC | Age: 55
End: 2020-09-17

## 2020-09-17 ENCOUNTER — APPOINTMENT (OUTPATIENT)
Dept: NEUROLOGY | Facility: CLINIC | Age: 55
End: 2020-09-17
Payer: COMMERCIAL

## 2020-09-17 PROCEDURE — 95886 MUSC TEST DONE W/N TEST COMP: CPT

## 2020-09-17 PROCEDURE — 95910 NRV CNDJ TEST 7-8 STUDIES: CPT

## 2020-10-14 ENCOUNTER — APPOINTMENT (OUTPATIENT)
Dept: OBGYN | Facility: CLINIC | Age: 55
End: 2020-10-14
Payer: COMMERCIAL

## 2020-10-14 VITALS
TEMPERATURE: 98.2 F | DIASTOLIC BLOOD PRESSURE: 76 MMHG | HEIGHT: 72 IN | SYSTOLIC BLOOD PRESSURE: 108 MMHG | BODY MASS INDEX: 30.48 KG/M2 | WEIGHT: 225 LBS

## 2020-10-14 DIAGNOSIS — Z11.51 ENCOUNTER FOR SCREENING FOR HUMAN PAPILLOMAVIRUS (HPV): ICD-10-CM

## 2020-10-14 PROCEDURE — 99396 PREV VISIT EST AGE 40-64: CPT

## 2020-10-15 ENCOUNTER — RESULT REVIEW (OUTPATIENT)
Age: 55
End: 2020-10-15

## 2020-10-19 PROBLEM — Z11.51 SCREENING FOR HPV (HUMAN PAPILLOMAVIRUS): Status: ACTIVE | Noted: 2020-10-14

## 2020-10-19 LAB
CYTOLOGY CVX/VAG DOC THIN PREP: NORMAL
HPV HIGH+LOW RISK DNA PNL CVX: NOT DETECTED

## 2020-10-19 NOTE — HISTORY OF PRESENT ILLNESS
[FreeTextEntry1] : 55 y o P3 female presents for routine annual GYN care. Her last annual GYN visit was in 2018 and pap smear then showed NILM/HPV negative. \par She states she is overall in stable health and denies current GYN complaints. She reports normal bowel and bladder function. She is sexually active in a stable monogamous relationship without concerns.\par She is postmenopausal without significant symptoms.\par She works at [employer] and recently remarried\par Breast imaging in 10/2019 was benign BI-RADS 1\par Colonoscopy in 2019 was normal\par Bone density in 2019 showed normal hip/osteoporosis spine\par

## 2020-10-29 ENCOUNTER — RESULT REVIEW (OUTPATIENT)
Age: 55
End: 2020-10-29

## 2020-10-29 ENCOUNTER — APPOINTMENT (OUTPATIENT)
Dept: HEMATOLOGY ONCOLOGY | Facility: CLINIC | Age: 55
End: 2020-10-29
Payer: COMMERCIAL

## 2020-10-29 VITALS
WEIGHT: 229.99 LBS | HEIGHT: 72 IN | RESPIRATION RATE: 18 BRPM | TEMPERATURE: 97.9 F | DIASTOLIC BLOOD PRESSURE: 75 MMHG | HEART RATE: 75 BPM | OXYGEN SATURATION: 98 % | SYSTOLIC BLOOD PRESSURE: 113 MMHG | BODY MASS INDEX: 31.15 KG/M2

## 2020-10-29 PROCEDURE — 99072 ADDL SUPL MATRL&STAF TM PHE: CPT

## 2020-10-29 PROCEDURE — 99215 OFFICE O/P EST HI 40 MIN: CPT

## 2020-10-29 NOTE — ASSESSMENT
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  \par \par She presented to PCP with intermitted chest discomfort and was found to have elevated alkaline phosphatase and it was followed by immunofixation which showed biclonal IgM-kappa.  \par Review of labs in [facility] showed elevated ALP dating back to August 2006 with value between 120 to 250 with normal bilirubin. \par Neuropathy - worsening, toes to above ankles. Neurology evaluation. MAG negative . \par \par Viscosity -2 , IgM 1555.\par Increased neuropathy - LE stocking distribution. Recent fall, sustained fracture left ankle. Obtain MRI. \par Consulation Gerald Staples at [facility].  Biopsy of the nerve c/w neuropathy 2nd to IgM, increase level to above ankle.  Patient has indication for treatment with rituximab/ bendamustine. \par She wants to start the treatment in Jan 2021.\par Side effects and schema explained to patient. \par - bone marrow results reviewed with patient - 30% BM involvement with MYD 88 mutation present 3/2018\par \par PETCT - ALICIA 2020\par FKL - ratio 4.12, IgM 1634- stable\par \par \par  MAG- negative \par Muscle cramping - stretching exercises \par \par S/p gastric bypass\par - s/p IV iron\par - stable iron level \par - Zinc- borderline level, copper- \par - Zinc Supplement \par \par Osteoporosis - 2.5.  \par last bone density 10/19 \par T-score -2.8\par Risk factors: postmenopausal, Waldenstrom macroglobulinemia. \par Recommended:\par 1. Vitamin D\par 2. Calcium supplement 500mg\par 3. Weight bearing exercises\par 4. Prolia 6/18, 12/18, Dec 2019- June postponed b/o implants,  - continue q 6 months. \par \par vit D level- 24 Dec 2019 \par - start 50 k vit D weekly\par - repeat level today

## 2020-10-29 NOTE — CONSULT LETTER
[Dear  ___] : Dear  [unfilled], [Consult Letter:] : I had the pleasure of evaluating your patient, [unfilled]. [Please see my note below.] : Please see my note below. [Sincerely,] : Sincerely, [FreeTextEntry3] : Adilia Mireles MD\par [facility] [place] at [facility]\par

## 2020-10-29 NOTE — HISTORY OF PRESENT ILLNESS
[de-identified] : 52 year old female who is referred by Dr.Tobe Sanchez for initial consultation for newly diagnosed Waldenstrom macroglobulinemia.  \par She developed intermittent chest pressure and underwent laboratory testing which showed positive immunofixation for double clone of IgM.  \par She does not have the records but saw  underwent  .  She believes IgM is in the 9477-8777 range and the serum viscosity is slightly elevated.\par She denies weight loss, gum bleeding but has h/o chronic bruising, no neuropathy, imbalance or difficulty concentrating.  \par She has observed changes with her vision but didn’t see ophthalmologist.  \par She had h/o gastric bypass and has significant iron deficiency.   [FreeTextEntry1] : s/p IV iron [de-identified] : Patient presents for WM, KALEIGH, and lymphoma.  Patient is up to date with mammography. EMG with Dr. Montemayor done 9/17/20 for neuropathy. Patient states she had punch biopsy of left foot at [facility] in [place].

## 2020-10-29 NOTE — REVIEW OF SYSTEMS
[Fatigue] : fatigue [Palpitations] : palpitations [Negative] : Allergic/Immunologic [Muscle Pain] : no muscle pain [FreeTextEntry5] : intermittent [FreeTextEntry9] : muscle cramping bilateral legs

## 2020-11-16 DIAGNOSIS — R11.0 NAUSEA: ICD-10-CM

## 2020-11-18 ENCOUNTER — NON-APPOINTMENT (OUTPATIENT)
Age: 55
End: 2020-11-18

## 2020-11-19 ENCOUNTER — RESULT REVIEW (OUTPATIENT)
Age: 55
End: 2020-11-19

## 2020-11-19 ENCOUNTER — APPOINTMENT (OUTPATIENT)
Dept: HEMATOLOGY ONCOLOGY | Facility: CLINIC | Age: 55
End: 2020-11-19
Payer: COMMERCIAL

## 2020-11-19 VITALS
BODY MASS INDEX: 31.69 KG/M2 | OXYGEN SATURATION: 98 % | WEIGHT: 234 LBS | RESPIRATION RATE: 20 BRPM | DIASTOLIC BLOOD PRESSURE: 77 MMHG | HEIGHT: 72 IN | HEART RATE: 66 BPM | TEMPERATURE: 97.5 F | SYSTOLIC BLOOD PRESSURE: 115 MMHG

## 2020-11-19 PROCEDURE — 99499A: CUSTOM | Mod: NC

## 2020-11-29 ENCOUNTER — TRANSCRIPTION ENCOUNTER (OUTPATIENT)
Age: 55
End: 2020-11-29

## 2020-12-10 DIAGNOSIS — R06.02 SHORTNESS OF BREATH: ICD-10-CM

## 2020-12-21 ENCOUNTER — NON-APPOINTMENT (OUTPATIENT)
Age: 55
End: 2020-12-21

## 2020-12-22 ENCOUNTER — TRANSCRIPTION ENCOUNTER (OUTPATIENT)
Age: 55
End: 2020-12-22

## 2020-12-28 ENCOUNTER — APPOINTMENT (OUTPATIENT)
Dept: HEMATOLOGY ONCOLOGY | Facility: CLINIC | Age: 55
End: 2020-12-28

## 2020-12-28 ENCOUNTER — RESULT REVIEW (OUTPATIENT)
Age: 55
End: 2020-12-28

## 2021-01-13 ENCOUNTER — RESULT REVIEW (OUTPATIENT)
Age: 56
End: 2021-01-13

## 2021-01-13 ENCOUNTER — APPOINTMENT (OUTPATIENT)
Dept: HEMATOLOGY ONCOLOGY | Facility: CLINIC | Age: 56
End: 2021-01-13
Payer: COMMERCIAL

## 2021-01-13 VITALS
RESPIRATION RATE: 16 BRPM | BODY MASS INDEX: 31.15 KG/M2 | TEMPERATURE: 96.4 F | OXYGEN SATURATION: 99 % | DIASTOLIC BLOOD PRESSURE: 73 MMHG | HEART RATE: 69 BPM | SYSTOLIC BLOOD PRESSURE: 111 MMHG | WEIGHT: 229.99 LBS | HEIGHT: 72 IN

## 2021-01-13 PROCEDURE — 99072 ADDL SUPL MATRL&STAF TM PHE: CPT

## 2021-01-13 PROCEDURE — 99215 OFFICE O/P EST HI 40 MIN: CPT

## 2021-01-13 NOTE — HISTORY OF PRESENT ILLNESS
[de-identified] : 52 year old female who is referred by Dr.Tobe Sanchez for initial consultation for newly diagnosed Waldenstrom macroglobulinemia.  \par She developed intermittent chest pressure and underwent laboratory testing which showed positive immunofixation for double clone of IgM.  \par She does not have the records but saw  underwent  .  She believes IgM is in the 8897-8451 range and the serum viscosity is slightly elevated.\par She denies weight loss, gum bleeding but has h/o chronic bruising, no neuropathy, imbalance or difficulty concentrating.  \par She has observed changes with her vision but didn’t see ophthalmologist.  \par She had h/o gastric bypass and has significant iron deficiency.   [FreeTextEntry1] : s/p IV iron [de-identified] : Patient presents for WM, KALEIGH, and lymphoma.  Patient is s/p COVID infection- Remdesivir infusion.  She is having bilateral swelling, redness and warmth to her legs/feet- she is on Eliquis 2.5 but only 1 per day

## 2021-01-13 NOTE — PHYSICAL EXAM
[Fully active, able to carry on all pre-disease performance without restriction] : Status 0 - Fully active, able to carry on all pre-disease performance without restriction [Normal] : affect appropriate [de-identified] : LE edema - erthema

## 2021-01-13 NOTE — CONSULT LETTER
[Dear  ___] : Dear  [unfilled], [Consult Letter:] : I had the pleasure of evaluating your patient, [unfilled]. [Please see my note below.] : Please see my note below. [Sincerely,] : Sincerely, [FreeTextEntry3] : Adilia Mireles MD\par [facility] [place] at [facility]\par

## 2021-01-13 NOTE — ASSESSMENT
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  \par \par She presented to PCP with intermitted chest discomfort and was found to have elevated alkaline phosphatase and it was followed by immunofixation which showed biclonal IgM-kappa.  \par Review of labs in [facility] showed elevated ALP dating back to August 2006 with value between 120 to 250 with normal bilirubin. \par Neuropathy - worsening, toes to above ankles. Neurology evaluation. MAG negative . \par \par Viscosity -2 , IgM 1555.\par Increased neuropathy - LE stocking distribution. Recent fall, sustained fracture left ankle. Obtain MRI. \par Consultation with Gerald Staples at [facility].  Biopsy of the nerve c/w neuropathy 2nd to IgM, increase level to above ankle.  Patient has indication for treatment with rituximab/ bendamustine. \par She wants to start the treatment in Jan 2021.\par Side effects and schema explained to patient. \par \par - bone marrow results reviewed with patient - 30% BM involvement with MYD 88 mutation present 3/2018\par 11/16/2020- cycle 1 - oz/ rituximab \par 1/13/2021- Cycle 2 Oz/ rituximab.  Delay b/o COVID- now recovering.  LE edema, erythema - order DOppler, check US r/o DVT.  Patient takes Eliquis 2.5 mg PO only once a day  \par \par PETCT - ALICIA 2020\par FKL - ratio 4.12, IgM 1634- stable\par \par MAG- negative \par Muscle cramping - stretching exercises \par \par S/p gastric bypass\par - s/p IV iron\par - stable iron level \par - Zinc- borderline level, copper- \par - Zinc Supplement \par \par Osteoporosis - 2.5.  \par last bone density 10/19 \par T-score -2.8\par Risk factors: postmenopausal, Waldenstrom macroglobulinemia. \par Recommended:\par 1. Vitamin D\par 2. Calcium supplement 500mg\par 3. Weight bearing exercises\par 4. Prolia 6/18, 12/18, Oct 2019-, June 2020, Jan 2021 - continue q 6 months. \par \par vit D level- 24 Dec 2019 \par - start 50 k vit D weekly\par - repeat level today

## 2021-01-13 NOTE — REVIEW OF SYSTEMS
[Fatigue] : fatigue [Negative] : Allergic/Immunologic [Lower Ext Edema] : lower extremity edema [Palpitations] : no palpitations [Muscle Pain] : no muscle pain [FreeTextEntry9] : muscle cramping bilateral legs

## 2021-02-06 ENCOUNTER — RESULT REVIEW (OUTPATIENT)
Age: 56
End: 2021-02-06

## 2021-02-10 ENCOUNTER — APPOINTMENT (OUTPATIENT)
Dept: HEMATOLOGY ONCOLOGY | Facility: CLINIC | Age: 56
End: 2021-02-10
Payer: COMMERCIAL

## 2021-02-28 ENCOUNTER — RESULT REVIEW (OUTPATIENT)
Age: 56
End: 2021-02-28

## 2021-03-02 ENCOUNTER — RESULT REVIEW (OUTPATIENT)
Age: 56
End: 2021-03-02

## 2021-03-03 ENCOUNTER — APPOINTMENT (OUTPATIENT)
Dept: HEMATOLOGY ONCOLOGY | Facility: CLINIC | Age: 56
End: 2021-03-03
Payer: COMMERCIAL

## 2021-03-03 ENCOUNTER — RESULT REVIEW (OUTPATIENT)
Age: 56
End: 2021-03-03

## 2021-03-03 VITALS
HEIGHT: 72 IN | RESPIRATION RATE: 16 BRPM | DIASTOLIC BLOOD PRESSURE: 78 MMHG | BODY MASS INDEX: 29.93 KG/M2 | HEART RATE: 76 BPM | TEMPERATURE: 98.4 F | OXYGEN SATURATION: 98 % | WEIGHT: 220.99 LBS | SYSTOLIC BLOOD PRESSURE: 119 MMHG

## 2021-03-03 PROCEDURE — 99214 OFFICE O/P EST MOD 30 MIN: CPT

## 2021-03-03 PROCEDURE — 99072 ADDL SUPL MATRL&STAF TM PHE: CPT

## 2021-03-03 RX ORDER — APIXABAN 2.5 MG/1
2.5 TABLET, FILM COATED ORAL
Refills: 0 | Status: COMPLETED | COMMUNITY
End: 2021-03-03

## 2021-03-03 RX ORDER — FUROSEMIDE 20 MG/1
20 TABLET ORAL
Qty: 30 | Refills: 0 | Status: COMPLETED | COMMUNITY
Start: 2021-01-14 | End: 2021-03-03

## 2021-03-03 NOTE — CONSULT LETTER
[FreeTextEntry3] : Adilia Mireles MD\par [facility] [place] at [facility]\par

## 2021-03-03 NOTE — ASSESSMENT
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  \par \par She presented to PCP with intermitted chest discomfort and was found to have elevated alkaline phosphatase and it was followed by immunofixation which showed biclonal IgM-kappa.  \par Review of labs in [facility] showed elevated ALP dating back to August 2006 with value between 120 to 250 with normal bilirubin. \par Neuropathy - worsening, toes to above ankles. Neurology evaluation. MAG negative . \par \par Viscosity -2 , IgM 1555.\par Increased neuropathy - LE stocking distribution. Recent fall, sustained fracture left ankle. Obtain MRI. \par Consultation with Gerald Staples at [facility].  Biopsy of the nerve c/w neuropathy 2nd to IgM, increase level to above ankle.  Patient has indication for treatment with rituximab/ bendamustine. \par She wants to start the treatment in Jan 2021.\par Side effects and schema explained to patient. \par \par - bone marrow results reviewed with patient - 30% BM involvement with MYD 88 mutation present 3/2018\par 11/16/2020- cycle 1 - oz/ rituximab \par 1/13/2021- Cycle 2 Oz/ rituximab.  Delay b/o COVID- now recovering.  LE edema, erythema - order DOppler, check US r/o DVT.  Patient takes Eliquis 2.5 mg PO only once a day  \par \par PETCT - ALICIA 2020\par FKL - ratio 4.12, IgM 1634- stable\par \par MAG- negative \par Muscle cramping - stretching exercises \par \par S/p gastric bypass\par - s/p IV iron\par - stable iron level \par - Zinc- borderline level, copper- \par - Zinc Supplement \par \par Osteoporosis - 2.5.  \par last bone density 10/19 \par T-score -2.8\par Risk factors: postmenopausal, Waldenstrom macroglobulinemia. \par Recommended:\par 1. Vitamin D\par 2. Calcium supplement 500mg\par 3. Weight bearing exercises\par 4. Prolia 6/18, 12/18, Oct 2019-, June 2020, Jan 2021 - continue q 6 months. \par \par vit D level- 24 Dec 2019 \par - start 50 k vit D weekly\par - repeat level today \par \par 3/3/2021\par Patient underwent port placement yesterday\par Neutropenia -0.8\par Patient afebrile, prophylactic antibiotics. \par Neupogen 480 today\par Check CBC in 48h

## 2021-03-03 NOTE — HISTORY OF PRESENT ILLNESS
[de-identified] : 52 year old female who is referred by Dr.Tobe Sanchez for initial consultation for newly diagnosed Waldenstrom macroglobulinemia.  \par She developed intermittent chest pressure and underwent laboratory testing which showed positive immunofixation for double clone of IgM.  \par She does not have the records but saw  underwent  .  She believes IgM is in the 7976-7884 range and the serum viscosity is slightly elevated.\par She denies weight loss, gum bleeding but has h/o chronic bruising, no neuropathy, imbalance or difficulty concentrating.  \par She has observed changes with her vision but didn’t see ophthalmologist.  \par She had h/o gastric bypass and has significant iron deficiency.   [FreeTextEntry1] : s/p IV iron [de-identified] : Patient presents for WM, KALEIGH, and lymphoma.  She was neutropenic yesterday- WBC 0.8 no fevers feeling well

## 2021-03-03 NOTE — REVIEW OF SYSTEMS
[Palpitations] : no palpitations [Muscle Pain] : no muscle pain [FreeTextEntry9] : muscle cramping bilateral legs

## 2021-03-05 ENCOUNTER — RESULT REVIEW (OUTPATIENT)
Age: 56
End: 2021-03-05

## 2021-03-05 ENCOUNTER — APPOINTMENT (OUTPATIENT)
Dept: HEMATOLOGY ONCOLOGY | Facility: CLINIC | Age: 56
End: 2021-03-05
Payer: COMMERCIAL

## 2021-03-05 VITALS
WEIGHT: 219.3 LBS | OXYGEN SATURATION: 98 % | BODY MASS INDEX: 29.7 KG/M2 | RESPIRATION RATE: 16 BRPM | HEART RATE: 78 BPM | SYSTOLIC BLOOD PRESSURE: 104 MMHG | HEIGHT: 72 IN | DIASTOLIC BLOOD PRESSURE: 71 MMHG | TEMPERATURE: 98.1 F

## 2021-03-05 DIAGNOSIS — D70.2 OTHER DRUG-INDUCED AGRANULOCYTOSIS: ICD-10-CM

## 2021-03-05 PROCEDURE — 99214 OFFICE O/P EST MOD 30 MIN: CPT

## 2021-03-05 PROCEDURE — 99072 ADDL SUPL MATRL&STAF TM PHE: CPT

## 2021-03-05 NOTE — PHYSICAL EXAM
[Fully active, able to carry on all pre-disease performance without restriction] : Status 0 - Fully active, able to carry on all pre-disease performance without restriction [Normal] : affect appropriate [de-identified] : LE edema - erthema

## 2021-03-05 NOTE — ASSESSMENT
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  \par \par She presented to PCP with intermitted chest discomfort and was found to have elevated alkaline phosphatase and it was followed by immunofixation which showed biclonal IgM-kappa.  \par Review of labs in [facility] showed elevated ALP dating back to August 2006 with value between 120 to 250 with normal bilirubin. \par Neuropathy - worsening, toes to above ankles. Neurology evaluation. MAG negative . \par \par Viscosity -2 , IgM 1555.\par Increased neuropathy - LE stocking distribution. Recent fall, sustained fracture left ankle. Obtain MRI. \par Consultation with Gerald Staples at [facility].  Biopsy of the nerve c/w neuropathy 2nd to IgM, increase level to above ankle.  Patient has indication for treatment with rituximab/ bendamustine. \par She wants to start the treatment in Jan 2021.\par Side effects and schema explained to patient. \par \par - bone marrow results reviewed with patient - 30% BM involvement with MYD 88 mutation present 3/2018\par 11/16/2020- cycle 1 - oz/ rituximab \par 1/13/2021- Cycle 2 Oz/ rituximab.  Delay b/o COVID- now recovering.  LE edema, erythema - order DOppler, check US r/o DVT.  Patient takes Eliquis 2.5 mg PO only once a day  \par \par PETCT - ALICIA 2020\par FKL - ratio 4.12, IgM 1634- stable\par \par MAG- negative \par Muscle cramping - stretching exercises \par \par S/p gastric bypass\par - s/p IV iron\par - stable iron level \par - Zinc- borderline level, copper- \par - Zinc Supplement \par \par Osteoporosis - 2.5.  \par last bone density 10/19 \par T-score -2.8\par Risk factors: postmenopausal, Waldenstrom macroglobulinemia. \par Recommended:\par 1. Vitamin D\par 2. Calcium supplement 500mg\par 3. Weight bearing exercises\par 4. Prolia 6/18, 12/18, Oct 2019-, June 2020, Jan 2021 - continue q 6 months. \par \par vit D level- 24 Dec 2019 \par - start 50 k vit D weekly\par - repeat level today \par \par 3/3/2021\par Patient underwent port placement yesterday\par Neutropenia -0.8\par Patient afebrile, prophylactic antibiotics. \par Neupogen 480 today\par Check CBC in 48h\par \par 3/5/2021\par , WBC 1.3\par Granix today and rx home as patient working \par Neutropenic precautions and diet\par Rx for vit B12 \par Unclear etiology of neutropenia\par Patient had Covid, s/p Convalescent plasma Jan 14, 2021, s/p remdesivir\par MIght require bone marrow biopsy if no recovery \par Hold treatment for now \par

## 2021-03-05 NOTE — HISTORY OF PRESENT ILLNESS
[de-identified] : 52 year old female who is referred by Dr.Tobe Sanchez for initial consultation for newly diagnosed Waldenstrom macroglobulinemia.  \par She developed intermittent chest pressure and underwent laboratory testing which showed positive immunofixation for double clone of IgM.  \par She does not have the records but saw  underwent  .  She believes IgM is in the 5587-1055 range and the serum viscosity is slightly elevated.\par She denies weight loss, gum bleeding but has h/o chronic bruising, no neuropathy, imbalance or difficulty concentrating.  \par She has observed changes with her vision but didn’t see ophthalmologist.  \par She had h/o gastric bypass and has significant iron deficiency.   [FreeTextEntry1] : s/p IV iron [de-identified] : Patient presents for WM, KALEIGH, and lymphoma.  She was neutropenic yesterday- WBC 0.8 no fevers feeling well

## 2021-03-05 NOTE — CONSULT LETTER
[Dear  ___] : Dear  [unfilled], [Consult Letter:] : I had the pleasure of evaluating your patient, [unfilled]. [Please see my note below.] : Please see my note below. [Sincerely,] : Sincerely, [FreeTextEntry3] : Adilia Mireles MD\par [facility] [place] at [facility]\par

## 2021-03-05 NOTE — REVIEW OF SYSTEMS
[Fatigue] : fatigue [Lower Ext Edema] : lower extremity edema [Negative] : Allergic/Immunologic [Palpitations] : no palpitations [Muscle Pain] : no muscle pain [FreeTextEntry9] : muscle cramping bilateral legs

## 2021-03-08 ENCOUNTER — RESULT REVIEW (OUTPATIENT)
Age: 56
End: 2021-03-08

## 2021-03-08 ENCOUNTER — APPOINTMENT (OUTPATIENT)
Dept: HEMATOLOGY ONCOLOGY | Facility: CLINIC | Age: 56
End: 2021-03-08
Payer: COMMERCIAL

## 2021-03-08 VITALS
TEMPERATURE: 97.8 F | DIASTOLIC BLOOD PRESSURE: 73 MMHG | WEIGHT: 224.5 LBS | SYSTOLIC BLOOD PRESSURE: 115 MMHG | HEIGHT: 72 IN | HEART RATE: 75 BPM | RESPIRATION RATE: 18 BRPM | OXYGEN SATURATION: 99 % | BODY MASS INDEX: 30.41 KG/M2

## 2021-03-08 PROCEDURE — 99213 OFFICE O/P EST LOW 20 MIN: CPT

## 2021-03-08 PROCEDURE — 99072 ADDL SUPL MATRL&STAF TM PHE: CPT

## 2021-03-08 RX ORDER — AMOXICILLIN AND CLAVULANATE POTASSIUM 875; 125 MG/1; MG/1
875-125 TABLET, COATED ORAL
Qty: 10 | Refills: 0 | Status: COMPLETED | COMMUNITY
Start: 2021-03-02 | End: 2021-03-08

## 2021-03-08 NOTE — CONSULT LETTER
[Dear  ___] : Dear  [unfilled], [Consult Letter:] : I had the pleasure of evaluating your patient, [unfilled]. [Please see my note below.] : Please see my note below. [Sincerely,] : Sincerely, [FreeTextEntry3] : Adilia Mireles MD\par [facility] [place] at [facility]\par

## 2021-03-08 NOTE — ASSESSMENT
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  \par \par She presented to PCP with intermitted chest discomfort and was found to have elevated alkaline phosphatase and it was followed by immunofixation which showed biclonal IgM-kappa.  \par Review of labs in [facility] showed elevated ALP dating back to August 2006 with value between 120 to 250 with normal bilirubin. \par Neuropathy - worsening, toes to above ankles. Neurology evaluation. MAG negative . \par \par Viscosity -2 , IgM 1555.\par Increased neuropathy - LE stocking distribution. Recent fall, sustained fracture left ankle. Obtain MRI. \par Consultation with Gerald Staples at [facility].  Biopsy of the nerve c/w neuropathy 2nd to IgM, increase level to above ankle.  Patient has indication for treatment with rituximab/ bendamustine. \par She wants to start the treatment in Jan 2021.\par Side effects and schema explained to patient. \par \par - bone marrow results reviewed with patient - 30% BM involvement with MYD 88 mutation present 3/2018\par 11/16/2020- cycle 1 - oz/ rituximab \par 1/13/2021- Cycle 2 Oz/ rituximab.  Delay b/o COVID- now recovering.  LE edema, erythema - order DOppler, check US r/o DVT.  Patient takes Eliquis 2.5 mg PO only once a day  \par \par PETCT - ALICIA 2020\par FKL - ratio 4.12, IgM 1634- stable\par \par MAG- negative \par Muscle cramping - stretching exercises \par \par S/p gastric bypass\par - s/p IV iron\par - stable iron level \par - Zinc- borderline level, copper- \par - Zinc Supplement \par \par Osteoporosis - 2.5.  \par last bone density 10/19 \par T-score -2.8\par Risk factors: postmenopausal, Waldenstrom macroglobulinemia. \par Recommended:\par 1. Vitamin D\par 2. Calcium supplement 500mg\par 3. Weight bearing exercises\par 4. Prolia 6/18, 12/18, Oct 2019-, June 2020, Jan 2021 - continue q 6 months. \par \par vit D level- 24 Dec 2019 \par - start 50 k vit D weekly\par - repeat level today \par \par 3/3/2021\par Patient underwent port placement yesterday\par Neutropenia -0.8\par Patient afebrile, prophylactic antibiotics. \par Neupogen 480 today\par Check CBC in 48h\par \par 3/5/2021\par , WBC 1.3\par Granix today and rx home as patient working \par Neutropenic precautions and diet\par Rx for vit B12 \par Unclear etiology of neutropenia\par Patient had Covid, s/p Convalescent plasma Jan 14, 2021, s/p remdesivir\par MIght require bone marrow biopsy if no recovery \par Hold treatment for now \par \par 3/8/2021\par ANC 1.08, WBC 3.25\par zarzio today \par feeling well\par s/p Augmentin \par continue with valacyclovir\par no evidence of infections\par return Thursday for labs\par \par case and mgmt discussed with Dr. Carbajal- cbc, chem Thursday

## 2021-03-08 NOTE — HISTORY OF PRESENT ILLNESS
[de-identified] : 52 year old female who is referred by Dr.Tobe Sanchez for initial consultation for newly diagnosed Waldenstrom macroglobulinemia.  \par She developed intermittent chest pressure and underwent laboratory testing which showed positive immunofixation for double clone of IgM.  \par She does not have the records but saw  underwent  .  She believes IgM is in the 9411-4133 range and the serum viscosity is slightly elevated.\par She denies weight loss, gum bleeding but has h/o chronic bruising, no neuropathy, imbalance or difficulty concentrating.  \par She has observed changes with her vision but didn’t see ophthalmologist.  \par She had h/o gastric bypass and has significant iron deficiency.   [FreeTextEntry1] : s/p IV iron [de-identified] : Patient presents for WM, KALEIGH, and lymphoma.  She was neutropenic yesterday-W BC 3.25 anc 1.08 today no infections

## 2021-03-10 ENCOUNTER — NON-APPOINTMENT (OUTPATIENT)
Age: 56
End: 2021-03-10

## 2021-03-11 ENCOUNTER — RESULT REVIEW (OUTPATIENT)
Age: 56
End: 2021-03-11

## 2021-03-11 ENCOUNTER — APPOINTMENT (OUTPATIENT)
Dept: HEMATOLOGY ONCOLOGY | Facility: CLINIC | Age: 56
End: 2021-03-11
Payer: COMMERCIAL

## 2021-03-11 VITALS
RESPIRATION RATE: 16 BRPM | SYSTOLIC BLOOD PRESSURE: 116 MMHG | HEART RATE: 68 BPM | OXYGEN SATURATION: 98 % | HEIGHT: 72 IN | BODY MASS INDEX: 30.48 KG/M2 | TEMPERATURE: 97.2 F | DIASTOLIC BLOOD PRESSURE: 78 MMHG | WEIGHT: 225 LBS

## 2021-03-11 DIAGNOSIS — R31.9 HEMATURIA, UNSPECIFIED: ICD-10-CM

## 2021-03-11 DIAGNOSIS — R91.1 SOLITARY PULMONARY NODULE: ICD-10-CM

## 2021-03-11 PROCEDURE — 99213 OFFICE O/P EST LOW 20 MIN: CPT

## 2021-03-11 PROCEDURE — 99072 ADDL SUPL MATRL&STAF TM PHE: CPT

## 2021-03-12 NOTE — ASSESSMENT
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  \par \par She presented to PCP with intermitted chest discomfort and was found to have elevated alkaline phosphatase and it was followed by immunofixation which showed biclonal IgM-kappa.  \par Review of labs in [facility] showed elevated ALP dating back to August 2006 with value between 120 to 250 with normal bilirubin. \par Neuropathy - worsening, toes to above ankles. Neurology evaluation. MAG negative . \par \par Viscosity -2 , IgM 1555.\par Increased neuropathy - LE stocking distribution. Recent fall, sustained fracture left ankle. Obtain MRI. \par Consultation with Gerald Staples at [facility].  Biopsy of the nerve c/w neuropathy 2nd to IgM, increase level to above ankle.  Patient has indication for treatment with rituximab/ bendamustine. \par She wants to start the treatment in Jan 2021.\par Side effects and schema explained to patient. \par \par - bone marrow results reviewed with patient - 30% BM involvement with MYD 88 mutation present 3/2018\par 11/16/2020- cycle 1 - oz/ rituximab \par 1/13/2021- Cycle 2 Oz/ rituximab.  Delay b/o COVID- now recovering.  LE edema, erythema - order DOppler, check US r/o DVT.  Patient takes Eliquis 2.5 mg PO only once a day  \par \par PETCT - ALICIA 2020\par FKL - ratio 4.12, IgM 1634- stable\par \par MAG- negative \par Muscle cramping - stretching exercises \par \par S/p gastric bypass\par - s/p IV iron\par - stable iron level \par - Zinc- borderline level, copper- \par - Zinc Supplement \par \par Osteoporosis - 2.5.  \par last bone density 10/19 \par T-score -2.8\par Risk factors: postmenopausal, Waldenstrom macroglobulinemia. \par Recommended:\par 1. Vitamin D\par 2. Calcium supplement 500mg\par 3. Weight bearing exercises\par 4. Prolia 6/18, 12/18, Oct 2019-, June 2020, Jan 2021 - continue q 6 months. \par \par vit D level- 24 Dec 2019 \par - start 50 k vit D weekly\par - repeat level today \par \par 3/3/2021\par Patient underwent port placement yesterday\par Neutropenia -0.8\par Patient afebrile, prophylactic antibiotics. \par Neupogen 480 today\par Check CBC in 48h\par \par 3/5/2021\par , WBC 1.3\par Granix today and rx home as patient working \par Neutropenic precautions and diet\par Rx for vit B12 \par Unclear etiology of neutropenia\par Patient had Covid, s/p Convalescent plasma Jan 14, 2021, s/p remdesivir\par MIght require bone marrow biopsy if no recovery \par Hold treatment for now \par \par 3/8/2021\par ANC 1.08, WBC 3.25\par zarzio today \par feeling well\par s/p Augmentin \par continue with valacyclovir\par no evidence of infections\par return Thursday for labs\par \par 3/11/2021\par WBC 4.2, anc 2.4 no Neupogen today\par episode of questionable hematuria yesterday- patient had beets for lunch urinalysis negative\par pt feeling well\par repeat labs on Monday, if stable resume tx next week\par \par case and mgmt discussed with Dr. Carbajal- cbc, chem Monday

## 2021-03-12 NOTE — CONSULT LETTER
[FreeTextEntry3] : Adilia Mireles MD\par [facility] [place] at [facility]\par

## 2021-03-12 NOTE — HISTORY OF PRESENT ILLNESS
[de-identified] : 52 year old female who is referred by Dr.Tobe Sanchez for initial consultation for newly diagnosed Waldenstrom macroglobulinemia.  \par She developed intermittent chest pressure and underwent laboratory testing which showed positive immunofixation for double clone of IgM.  \par She does not have the records but saw  underwent  .  She believes IgM is in the 8368-2449 range and the serum viscosity is slightly elevated.\par She denies weight loss, gum bleeding but has h/o chronic bruising, no neuropathy, imbalance or difficulty concentrating.  \par She has observed changes with her vision but didn’t see ophthalmologist.  \par She had h/o gastric bypass and has significant iron deficiency.   [FreeTextEntry1] : s/p IV iron [de-identified] : Patient presents for WM, KALEIGH, and lymphoma.  Here for cbc check- episode of questionable hematuria yesterday

## 2021-03-15 ENCOUNTER — RESULT REVIEW (OUTPATIENT)
Age: 56
End: 2021-03-15

## 2021-03-15 ENCOUNTER — APPOINTMENT (OUTPATIENT)
Dept: HEMATOLOGY ONCOLOGY | Facility: CLINIC | Age: 56
End: 2021-03-15

## 2021-03-15 VITALS
DIASTOLIC BLOOD PRESSURE: 68 MMHG | SYSTOLIC BLOOD PRESSURE: 110 MMHG | HEIGHT: 72 IN | OXYGEN SATURATION: 99 % | RESPIRATION RATE: 16 BRPM | TEMPERATURE: 97.6 F | HEART RATE: 78 BPM

## 2021-04-01 ENCOUNTER — APPOINTMENT (OUTPATIENT)
Dept: HEMATOLOGY ONCOLOGY | Facility: CLINIC | Age: 56
End: 2021-04-01
Payer: COMMERCIAL

## 2021-04-15 ENCOUNTER — RESULT REVIEW (OUTPATIENT)
Age: 56
End: 2021-04-15

## 2021-04-15 ENCOUNTER — APPOINTMENT (OUTPATIENT)
Dept: HEMATOLOGY ONCOLOGY | Facility: CLINIC | Age: 56
End: 2021-04-15
Payer: COMMERCIAL

## 2021-04-15 VITALS
HEIGHT: 72 IN | BODY MASS INDEX: 30.88 KG/M2 | WEIGHT: 228 LBS | SYSTOLIC BLOOD PRESSURE: 119 MMHG | RESPIRATION RATE: 16 BRPM | TEMPERATURE: 97.9 F | HEART RATE: 78 BPM | OXYGEN SATURATION: 99 % | DIASTOLIC BLOOD PRESSURE: 78 MMHG

## 2021-04-15 PROCEDURE — 99215 OFFICE O/P EST HI 40 MIN: CPT

## 2021-04-15 PROCEDURE — 99072 ADDL SUPL MATRL&STAF TM PHE: CPT

## 2021-04-15 NOTE — ASSESSMENT
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  \par \par She presented to PCP with intermitted chest discomfort and was found to have elevated alkaline phosphatase and it was followed by immunofixation which showed biclonal IgM-kappa.  \par Review of labs in [facility] showed elevated ALP dating back to August 2006 with value between 120 to 250 with normal bilirubin. \par Neuropathy - worsening, toes to above ankles. Neurology evaluation. MAG negative . \par \par Viscosity -2 , IgM 1555.\par Increased neuropathy - LE stocking distribution. Recent fall, sustained fracture left ankle. Obtain MRI. \par Consultation with Gerald Staples at [facility].  Biopsy of the nerve c/w neuropathy 2nd to IgM, increase level to above ankle.  Patient has indication for treatment with rituximab/ bendamustine. \par She wants to start the treatment in Jan 2021.\par Side effects and schema explained to patient. \par \par - bone marrow results reviewed with patient - 30% BM involvement with MYD 88 mutation present 3/2018\par 11/16/2020- cycle 1 - oz/ rituximab \par 1/13/2021- Cycle 2 Oz/ rituximab.  Delay b/o COVID- now recovering.  LE edema, erythema - order DOppler, check US r/o DVT.  Patient takes Eliquis 2.5 mg PO only once a day  \par \par PETCT - ALICIA 2020\par FKL - ratio 4.12, IgM 1634- stable\par \par MAG- negative \par Muscle cramping - stretching exercises \par \par S/p gastric bypass\par - s/p IV iron\par - stable iron level \par - Zinc- borderline level, copper- \par - Zinc Supplement \par \par Osteoporosis - 2.5.  \par last bone density 10/19 \par T-score -2.8\par Risk factors: postmenopausal, Waldenstrom macroglobulinemia. \par Recommended:\par 1. Vitamin D\par 2. Calcium supplement 500mg\par 3. Weight bearing exercises\par 4. Prolia 6/18, 12/18, Oct 2019-, June 2020, Jan 2021 - continue q 6 months. \par \par vit D level- 24 Dec 2019 \par - start 50 k vit D weekly\par - repeat level today \par \par 3/3/2021\par Patient underwent port placement yesterday\par Neutropenia -0.8\par Patient afebrile, prophylactic antibiotics. \par Neupogen 480 today\par Check CBC in 48h\par \par 3/5/2021\par , WBC 1.3\par Granix today and rx home as patient working \par Neutropenic precautions and diet\par Rx for vit B12 \par Unclear etiology of neutropenia\par Patient had Covid, s/p Convalescent plasma Jan 14, 2021, s/p remdesivir\par MIght require bone marrow biopsy if no recovery \par Hold treatment for now \par \par 3/8/2021\par ANC 1.08, WBC 3.25\par zarzio today \par feeling well\par s/p Augmentin \par continue with valacyclovir\par no evidence of infections\par return Thursday for labs\par \par 3/11/2021\par WBC 4.2, anc 2.4 no Neupogen today\par episode of questionable hematuria yesterday- patient had beets for lunch urinalysis negative\par pt feeling well\par repeat labs on Monday, if stable resume tx next week\par \par 4/15/2021\par Here for bendamustine/rituximab #3 \par IgM 625 Jan 2021- repeat today\par neuropathy - increased \par right achilles pain last week- subsided \par Neulasta on-pro\par \par \par case and mgmt discussed with Dr. Carbajal- cbc, chem Monday

## 2021-04-15 NOTE — CONSULT LETTER
[FreeTextEntry3] : Adilia Mireles MD\par [facility] [place] at [facility]\par

## 2021-04-15 NOTE — HISTORY OF PRESENT ILLNESS
[de-identified] : 52 year old female who is referred by Dr.Tobe Sanchez for initial consultation for newly diagnosed Waldenstrom macroglobulinemia.  \par She developed intermittent chest pressure and underwent laboratory testing which showed positive immunofixation for double clone of IgM.  \par She does not have the records but saw  underwent  .  She believes IgM is in the 5286-3963 range and the serum viscosity is slightly elevated.\par She denies weight loss, gum bleeding but has h/o chronic bruising, no neuropathy, imbalance or difficulty concentrating.  \par She has observed changes with her vision but didn’t see ophthalmologist.  \par She had h/o gastric bypass and has significant iron deficiency.   [FreeTextEntry1] : s/p IV iron [de-identified] : Patient presents for WM, KALEIGH, and lymphoma.  Here for cbc check- episode of questionable hematuria yesterday

## 2021-04-29 ENCOUNTER — APPOINTMENT (OUTPATIENT)
Dept: HEMATOLOGY ONCOLOGY | Facility: CLINIC | Age: 56
End: 2021-04-29

## 2021-04-29 ENCOUNTER — RESULT REVIEW (OUTPATIENT)
Age: 56
End: 2021-04-29

## 2021-04-29 VITALS
BODY MASS INDEX: 30.2 KG/M2 | SYSTOLIC BLOOD PRESSURE: 108 MMHG | DIASTOLIC BLOOD PRESSURE: 61 MMHG | TEMPERATURE: 97.5 F | HEIGHT: 72 IN | OXYGEN SATURATION: 99 % | RESPIRATION RATE: 16 BRPM | WEIGHT: 223 LBS | HEART RATE: 78 BPM

## 2021-05-05 ENCOUNTER — RESULT REVIEW (OUTPATIENT)
Age: 56
End: 2021-05-05

## 2021-05-05 ENCOUNTER — APPOINTMENT (OUTPATIENT)
Dept: HEMATOLOGY ONCOLOGY | Facility: CLINIC | Age: 56
End: 2021-05-05

## 2021-05-13 ENCOUNTER — TRANSCRIPTION ENCOUNTER (OUTPATIENT)
Age: 56
End: 2021-05-13

## 2021-05-20 ENCOUNTER — APPOINTMENT (OUTPATIENT)
Dept: HEMATOLOGY ONCOLOGY | Facility: CLINIC | Age: 56
End: 2021-05-20
Payer: COMMERCIAL

## 2021-05-20 ENCOUNTER — RESULT REVIEW (OUTPATIENT)
Age: 56
End: 2021-05-20

## 2021-05-20 VITALS
HEART RATE: 98 BPM | HEIGHT: 72 IN | WEIGHT: 228 LBS | RESPIRATION RATE: 18 BRPM | BODY MASS INDEX: 30.88 KG/M2 | OXYGEN SATURATION: 100 % | TEMPERATURE: 96.2 F | SYSTOLIC BLOOD PRESSURE: 105 MMHG | DIASTOLIC BLOOD PRESSURE: 70 MMHG

## 2021-05-20 DIAGNOSIS — J40 BRONCHITIS, NOT SPECIFIED AS ACUTE OR CHRONIC: ICD-10-CM

## 2021-05-20 PROCEDURE — 99072 ADDL SUPL MATRL&STAF TM PHE: CPT

## 2021-05-20 PROCEDURE — 99215 OFFICE O/P EST HI 40 MIN: CPT

## 2021-05-25 PROBLEM — J40 BRONCHITIS: Status: ACTIVE | Noted: 2021-05-25

## 2021-05-25 NOTE — CONSULT LETTER
[FreeTextEntry3] : Adilia Mireles MD\par [facility] [place] at [facility]\par

## 2021-05-25 NOTE — HISTORY OF PRESENT ILLNESS
[de-identified] : 52 year old female who is referred by Dr.Tobe Sanchez for initial consultation for newly diagnosed Waldenstrom macroglobulinemia.  \par She developed intermittent chest pressure and underwent laboratory testing which showed positive immunofixation for double clone of IgM.  \par She does not have the records but saw  underwent  .  She believes IgM is in the 3611-6698 range and the serum viscosity is slightly elevated.\par She denies weight loss, gum bleeding but has h/o chronic bruising, no neuropathy, imbalance or difficulty concentrating.  \par She has observed changes with her vision but didn’t see ophthalmologist.  \par She had h/o gastric bypass and has significant iron deficiency.   [FreeTextEntry1] : s/p IV iron [de-identified] : Patient presents for WM, KALEIGH, and lymphoma.  Patient went to urgent care last Wednesday for a sore throat and productive cough. Patient denies fever, chills, SOB. She was negative for COVID19, strep, and flu. She reports having 1 bout of diarrhea last night and took Imodium.

## 2021-05-25 NOTE — ASSESSMENT
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  \par \par She presented to PCP with intermitted chest discomfort and was found to have elevated alkaline phosphatase and it was followed by immunofixation which showed biclonal IgM-kappa.  \par Review of labs in [facility] showed elevated ALP dating back to August 2006 with value between 120 to 250 with normal bilirubin. \par Neuropathy - worsening, toes to above ankles. Neurology evaluation. MAG negative . \par \par Viscosity -2 , IgM 1555.\par Increased neuropathy - LE stocking distribution. Recent fall, sustained fracture left ankle. Obtain MRI. \par Consultation with Gerald Staples at [facility].  Biopsy of the nerve c/w neuropathy 2nd to IgM, increase level to above ankle.  Patient has indication for treatment with rituximab/ bendamustine. \par She wants to start the treatment in Jan 2021.\par Side effects and schema explained to patient. \par \par - bone marrow results reviewed with patient - 30% BM involvement with MYD 88 mutation present 3/2018\par 11/16/2020- cycle 1 - oz/ rituximab \par 1/13/2021- Cycle 2 Oz/ rituximab.  Delay b/o COVID- now recovering.  LE edema, erythema - order DOppler, check US r/o DVT.  Patient takes Eliquis 2.5 mg PO only once a day  \par \par PETCT - ALICIA 2020\par FKL - ratio 4.12, IgM 1634- stable\par \par MAG- negative \par Muscle cramping - stretching exercises \par \par S/p gastric bypass\par - s/p IV iron\par - stable iron level \par - Zinc- borderline level, copper- \par - Zinc Supplement \par \par Osteoporosis - 2.5.  \par last bone density 10/19 \par T-score -2.8\par Risk factors: postmenopausal, Waldenstrom macroglobulinemia. \par Recommended:\par 1. Vitamin D\par 2. Calcium supplement 500mg\par 3. Weight bearing exercises\par 4. Prolia 6/18, 12/18, Oct 2019-, June 2020, Jan 2021 - continue q 6 months. \par \par vit D level- 24 Dec 2019 \par - start 50 k vit D weekly\par - repeat level today \par \par 3/3/2021\par Patient underwent port placement yesterday\par Neutropenia -0.8\par Patient afebrile, prophylactic antibiotics. \par Neupogen 480 today\par Check CBC in 48h\par \par 3/5/2021\par , WBC 1.3\par Granix today and rx home as patient working \par Neutropenic precautions and diet\par Rx for vit B12 \par Unclear etiology of neutropenia\par Patient had Covid, s/p Convalescent plasma Jan 14, 2021, s/p remdesivir\par MIght require bone marrow biopsy if no recovery \par Hold treatment for now \par \par 3/8/2021\par ANC 1.08, WBC 3.25\par zarzio today \par feeling well\par s/p Augmentin \par continue with valacyclovir\par no evidence of infections\par return Thursday for labs\par \par 3/11/2021\par WBC 4.2, anc 2.4 no Neupogen today\par episode of questionable hematuria yesterday- patient had beets for lunch urinalysis negative\par pt feeling well\par repeat labs on Monday, if stable resume tx next week\par \par 4/15/2021\par Here for bendamustine/rituximab #3 \par IgM 625 Jan 2021- repeat today\par neuropathy - increased \par right achilles pain last week- subsided \par Neulasta on-pro\par \par 5/20/2021\par Infection viral last week, no fever recovering\par Oz/ rituximab #4 with Neulasta onpro\par Patient with neuropathy \par Monitor for toxicity \par Mourning from loss of family members\par \par \par - cbc, chem Monday, Immunoglobulins

## 2021-05-27 ENCOUNTER — NON-APPOINTMENT (OUTPATIENT)
Age: 56
End: 2021-05-27

## 2021-06-04 ENCOUNTER — APPOINTMENT (OUTPATIENT)
Dept: HEMATOLOGY ONCOLOGY | Facility: CLINIC | Age: 56
End: 2021-06-04

## 2021-06-04 ENCOUNTER — RESULT REVIEW (OUTPATIENT)
Age: 56
End: 2021-06-04

## 2021-06-04 VITALS
OXYGEN SATURATION: 98 % | DIASTOLIC BLOOD PRESSURE: 68 MMHG | HEART RATE: 88 BPM | SYSTOLIC BLOOD PRESSURE: 100 MMHG | TEMPERATURE: 97.8 F | HEIGHT: 72 IN | WEIGHT: 218 LBS | RESPIRATION RATE: 16 BRPM | BODY MASS INDEX: 29.53 KG/M2

## 2021-06-17 ENCOUNTER — RESULT REVIEW (OUTPATIENT)
Age: 56
End: 2021-06-17

## 2021-06-17 ENCOUNTER — APPOINTMENT (OUTPATIENT)
Dept: HEMATOLOGY ONCOLOGY | Facility: CLINIC | Age: 56
End: 2021-06-17
Payer: COMMERCIAL

## 2021-06-17 VITALS
BODY MASS INDEX: 29.8 KG/M2 | SYSTOLIC BLOOD PRESSURE: 110 MMHG | RESPIRATION RATE: 16 BRPM | DIASTOLIC BLOOD PRESSURE: 70 MMHG | HEART RATE: 64 BPM | OXYGEN SATURATION: 99 % | WEIGHT: 220 LBS | HEIGHT: 72 IN | TEMPERATURE: 97.2 F

## 2021-06-17 DIAGNOSIS — M79.89 OTHER SPECIFIED SOFT TISSUE DISORDERS: ICD-10-CM

## 2021-06-17 PROCEDURE — 99215 OFFICE O/P EST HI 40 MIN: CPT

## 2021-06-17 PROCEDURE — 99072 ADDL SUPL MATRL&STAF TM PHE: CPT

## 2021-06-17 RX ORDER — MULTIVITAMIN
TABLET ORAL
Refills: 0 | Status: ACTIVE | COMMUNITY

## 2021-06-17 RX ORDER — AMOXICILLIN AND CLAVULANATE POTASSIUM 875; 125 MG/1; MG/1
875-125 TABLET, COATED ORAL
Qty: 10 | Refills: 1 | Status: COMPLETED | COMMUNITY
Start: 2021-05-25 | End: 2021-06-17

## 2021-06-17 NOTE — PHYSICAL EXAM
[Fully active, able to carry on all pre-disease performance without restriction] : Status 0 - Fully active, able to carry on all pre-disease performance without restriction [Normal] : affect appropriate [de-identified] : LE edema - erthema

## 2021-06-17 NOTE — ASSESSMENT
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  \par \par She presented to PCP with intermitted chest discomfort and was found to have elevated alkaline phosphatase and it was followed by immunofixation which showed biclonal IgM-kappa.  \par Review of labs in [facility] showed elevated ALP dating back to August 2006 with value between 120 to 250 with normal bilirubin. \par Neuropathy - worsening, toes to above ankles. Neurology evaluation. MAG negative . \par \par Viscosity -2 , IgM 1555.\par Increased neuropathy - LE stocking distribution. Recent fall, sustained fracture left ankle. Obtain MRI. \par Consultation with Gerald Staples at [facility].  Biopsy of the nerve c/w neuropathy 2nd to IgM, increase level to above ankle.  Patient has indication for treatment with rituximab/ bendamustine. \par She wants to start the treatment in Jan 2021.\par Side effects and schema explained to patient. \par \par - bone marrow results reviewed with patient - 30% BM involvement with MYD 88 mutation present 3/2018\par 11/16/2020- cycle 1 - oz/ rituximab \par 1/13/2021- Cycle 2 Oz/ rituximab.  Delay b/o COVID- now recovering.  LE edema, erythema - order DOppler, check US r/o DVT.  Patient takes Eliquis 2.5 mg PO only once a day  \par \par PETCT - ALICIA 2020\par FKL - ratio 4.12, IgM 1634- stable\par \par MAG- negative \par Muscle cramping - stretching exercises \par \par S/p gastric bypass\par - s/p IV iron\par - stable iron level \par - Zinc- borderline level, copper- \par - Zinc Supplement \par \par Osteoporosis - 2.5.  \par last bone density 10/19 \par T-score -2.8\par Risk factors: postmenopausal, Waldenstrom macroglobulinemia. \par Recommended:\par 1. Vitamin D\par 2. Calcium supplement 500mg\par 3. Weight bearing exercises\par 4. Prolia 6/18, 12/18, Oct 2019-, June 2020, Jan 2021 - continue q 6 months. \par \par vit D level- 24 Dec 2019 \par - start 50 k vit D weekly\par - repeat level today \par \par 3/3/2021\par Patient underwent port placement yesterday\par Neutropenia -0.8\par Patient afebrile, prophylactic antibiotics. \par Neupogen 480 today\par Check CBC in 48h\par \par 3/5/2021\par , WBC 1.3\par Granix today and rx home as patient working \par Neutropenic precautions and diet\par Rx for vit B12 \par Unclear etiology of neutropenia\par Patient had Covid, s/p Convalescent plasma Jan 14, 2021, s/p remdesivir\par MIght require bone marrow biopsy if no recovery \par Hold treatment for now \par \par 3/8/2021\par ANC 1.08, WBC 3.25\par zarzio today \par feeling well\par s/p Augmentin \par continue with valacyclovir\par no evidence of infections\par return Thursday for labs\par \par 3/11/2021\par WBC 4.2, anc 2.4 no Neupogen today\par episode of questionable hematuria yesterday- patient had beets for lunch urinalysis negative\par pt feeling well\par repeat labs on Monday, if stable resume tx next week\par \par 4/15/2021\par Here for bendamustine/rituximab #3 \par IgM 625 Jan 2021- repeat today\par neuropathy - increased \par right achilles pain last week- subsided \par Neulasta on-pro\par \par 5/20/2021\par Infection viral last week, no fever recovering\par Oz/ rituximab #4 with Neulasta onpro\par Patient with neuropathy \par Monitor for toxicity \par Mourning from loss of family members\par \par 6/17/2021\par Oz/rituximab # 5 today \par insomnia post tx- trial of Ambien Istop checked \par constipation- colace\par slight increase of IgM- repeat today \par \par return in 1 month\par \par \par  cbc, chem  Immunoglobulins, waldenstroms panel- case and mgmt discussed with Dr. Mireles \par \par

## 2021-06-17 NOTE — HISTORY OF PRESENT ILLNESS
[de-identified] : 52 year old female who is referred by Dr.Tobe Sanchez for initial consultation for newly diagnosed Waldenstrom macroglobulinemia.  \par She developed intermittent chest pressure and underwent laboratory testing which showed positive immunofixation for double clone of IgM.  \par She does not have the records but saw  underwent  .  She believes IgM is in the 0085-9996 range and the serum viscosity is slightly elevated.\par She denies weight loss, gum bleeding but has h/o chronic bruising, no neuropathy, imbalance or difficulty concentrating.  \par She has observed changes with her vision but didn’t see ophthalmologist.  \par She had h/o gastric bypass and has significant iron deficiency.   [FreeTextEntry1] : s/p IV iron [de-identified] : Patient presents for WM, KALEIGH, and lymphoma.  Here for rituximab- feeling well

## 2021-06-17 NOTE — CONSULT LETTER
[Dear  ___] : Dear  [unfilled], [Consult Letter:] : I had the pleasure of evaluating your patient, [unfilled]. [Please see my note below.] : Please see my note below. [Sincerely,] : Sincerely, [FreeTextEntry3] : Adilia Mireles MD\par [facility] [place] at [facility]\par

## 2021-06-29 ENCOUNTER — RESULT REVIEW (OUTPATIENT)
Age: 56
End: 2021-06-29

## 2021-06-29 ENCOUNTER — APPOINTMENT (OUTPATIENT)
Dept: HEMATOLOGY ONCOLOGY | Facility: CLINIC | Age: 56
End: 2021-06-29

## 2021-06-29 VITALS
TEMPERATURE: 97.6 F | SYSTOLIC BLOOD PRESSURE: 113 MMHG | RESPIRATION RATE: 16 BRPM | OXYGEN SATURATION: 99 % | HEIGHT: 72 IN | HEART RATE: 81 BPM | BODY MASS INDEX: 29.01 KG/M2 | DIASTOLIC BLOOD PRESSURE: 73 MMHG | WEIGHT: 214.2 LBS

## 2021-07-14 DIAGNOSIS — D86.0 SARCOIDOSIS OF LUNG: ICD-10-CM

## 2021-07-15 ENCOUNTER — APPOINTMENT (OUTPATIENT)
Dept: HEMATOLOGY ONCOLOGY | Facility: CLINIC | Age: 56
End: 2021-07-15

## 2021-08-04 ENCOUNTER — TRANSCRIPTION ENCOUNTER (OUTPATIENT)
Age: 56
End: 2021-08-04

## 2021-08-17 ENCOUNTER — RESULT REVIEW (OUTPATIENT)
Age: 56
End: 2021-08-17

## 2021-08-17 ENCOUNTER — APPOINTMENT (OUTPATIENT)
Dept: HEMATOLOGY ONCOLOGY | Facility: CLINIC | Age: 56
End: 2021-08-17
Payer: COMMERCIAL

## 2021-08-17 VITALS
SYSTOLIC BLOOD PRESSURE: 105 MMHG | OXYGEN SATURATION: 98 % | TEMPERATURE: 97.9 F | DIASTOLIC BLOOD PRESSURE: 61 MMHG | WEIGHT: 214.4 LBS | BODY MASS INDEX: 29.04 KG/M2 | HEART RATE: 71 BPM | HEIGHT: 72 IN | RESPIRATION RATE: 16 BRPM

## 2021-08-17 DIAGNOSIS — U07.1 COVID-19: ICD-10-CM

## 2021-08-17 DIAGNOSIS — K04.7 PERIAPICAL ABSCESS W/OUT SINUS: ICD-10-CM

## 2021-08-17 PROCEDURE — 99214 OFFICE O/P EST MOD 30 MIN: CPT

## 2021-08-17 NOTE — CONSULT LETTER
[Dear  ___] : Dear  [unfilled], [Consult Letter:] : I had the pleasure of evaluating your patient, [unfilled]. [Please see my note below.] : Please see my note below. [Sincerely,] : Sincerely, [FreeTextEntry3] : Adilia Mireles MD\par [facility] [place] at [facility]\par

## 2021-08-17 NOTE — ASSESSMENT
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  \par \par She presented to PCP with intermitted chest discomfort and was found to have elevated alkaline phosphatase and it was followed by immunofixation which showed biclonal IgM-kappa.  \par Review of labs in [facility] showed elevated ALP dating back to August 2006 with value between 120 to 250 with normal bilirubin. \par Neuropathy - worsening, toes to above ankles. Neurology evaluation. MAG negative . \par \par Viscosity -2 , IgM 1555.\par Increased neuropathy - LE stocking distribution. Recent fall, sustained fracture left ankle. Obtain MRI. \par Consultation with Gerald Staples at [facility].  Biopsy of the nerve c/w neuropathy 2nd to IgM, increase level to above ankle.  Patient has indication for treatment with rituximab/ bendamustine. \par She wants to start the treatment in Jan 2021.\par Side effects and schema explained to patient. \par \par - bone marrow results reviewed with patient - 30% BM involvement with MYD 88 mutation present 3/2018\par 11/16/2020- cycle 1 - oz/ rituximab \par 1/13/2021- Cycle 2 Oz/ rituximab.  Delay b/o COVID- now recovering.  LE edema, erythema - order DOppler, check US r/o DVT.  Patient takes Eliquis 2.5 mg PO only once a day  \par \par PETCT - ALICIA 2020\par FKL - ratio 4.12, IgM 1634- stable\par \par MAG- negative \par Muscle cramping - stretching exercises \par \par S/p gastric bypass\par - s/p IV iron\par - stable iron level \par - Zinc- borderline level, copper- \par - Zinc Supplement \par \par Osteoporosis - 2.5.  \par last bone density 10/19 \par T-score -2.8\par Risk factors: postmenopausal, Waldenstrom macroglobulinemia. \par Recommended:\par 1. Vitamin D\par 2. Calcium supplement 500mg\par 3. Weight bearing exercises\par 4. Prolia 6/18, 12/18, Oct 2019-, June 2020, Jan 2021 - on hold b/o dental infection\par \par vit D level- 24 Dec 2019 \par - start 50 k vit D weekly\par - repeat level today \par \par 3/3/2021\par Patient underwent port placement yesterday\par Neutropenia -0.8\par Patient afebrile, prophylactic antibiotics. \par Neupogen 480 today\par Check CBC in 48h\par \par 3/5/2021\par , WBC 1.3\par Granix today and rx home as patient working \par Neutropenic precautions and diet\par Rx for vit B12 \par Unclear etiology of neutropenia\par Patient had Covid, s/p Convalescent plasma Jan 14, 2021, s/p remdesivir\par MIght require bone marrow biopsy if no recovery \par Hold treatment for now \par \par 3/8/2021\par ANC 1.08, WBC 3.25\par zarzio today \par feeling well\par s/p Augmentin \par continue with valacyclovir\par no evidence of infections\par return Thursday for labs\par \par 3/11/2021\par WBC 4.2, anc 2.4 no Neupogen today\par episode of questionable hematuria yesterday- patient had beets for lunch urinalysis negative\par pt feeling well\par repeat labs on Monday, if stable resume tx next week\par \par 4/15/2021\par Here for bendamustine/rituximab #3 \par IgM 625 Jan 2021- repeat today\par neuropathy - increased \par right achilles pain last week- subsided \par Neulasta on-pro\par \par 5/20/2021\par Infection viral last week, no fever recovering\par Oz/ rituximab #4 with Neulasta onpro\par Patient with neuropathy \par Monitor for toxicity \par Mourning from loss of family members\par \par 6/17/2021\par Oz/rituximab # 5 today \par insomnia post tx- trial of Ambien Istop checked \par constipation- colace\par slight increase of IgM- repeat today \par \par 8/17/2021\par S/p Covid vaccine few weeks ago, had reaction\par Episode of swollen legs- resolved\par Repeat IgM \par ANC 1500 \par Dental abscess - rx longer amoxicillin \par Neuropathy - decreased - periankle distribution \par \par return in 1 month\par \par \par  cbc, chem  Immunoglobulins, waldenstroms panel-\par \par

## 2021-08-17 NOTE — PHYSICAL EXAM
[Fully active, able to carry on all pre-disease performance without restriction] : Status 0 - Fully active, able to carry on all pre-disease performance without restriction [Normal] : affect appropriate [de-identified] : LE edema - erthema

## 2021-08-17 NOTE — HISTORY OF PRESENT ILLNESS
[de-identified] : 52 year old female who is referred by Dr.Tobe Sanchez for initial consultation for newly diagnosed Waldenstrom macroglobulinemia.  \par She developed intermittent chest pressure and underwent laboratory testing which showed positive immunofixation for double clone of IgM.  \par She does not have the records but saw  underwent  .  She believes IgM is in the 8132-0409 range and the serum viscosity is slightly elevated.\par She denies weight loss, gum bleeding but has h/o chronic bruising, no neuropathy, imbalance or difficulty concentrating.  \par She has observed changes with her vision but didn’t see ophthalmologist.  \par She had h/o gastric bypass and has significant iron deficiency.   [FreeTextEntry1] : s/p IV iron [de-identified] : Patient reports tooth abscess since June. Treated several times with antibiotic without much improvement. \par She reports the tooth is still infected. \par C/o ongoing fatigue, more so towards the end of the day.\par Patient reports pitting edema of the legs last week. Took 2 doses of Lasix this past Saturday - improved today. \par Reports getting Pfizer vaccine - reaction of high grade fever and body ache.

## 2021-08-27 ENCOUNTER — NON-APPOINTMENT (OUTPATIENT)
Age: 56
End: 2021-08-27

## 2021-10-12 ENCOUNTER — RESULT REVIEW (OUTPATIENT)
Age: 56
End: 2021-10-12

## 2021-10-12 ENCOUNTER — APPOINTMENT (OUTPATIENT)
Dept: HEMATOLOGY ONCOLOGY | Facility: CLINIC | Age: 56
End: 2021-10-12
Payer: COMMERCIAL

## 2021-10-12 VITALS
RESPIRATION RATE: 18 BRPM | OXYGEN SATURATION: 99 % | HEART RATE: 73 BPM | HEIGHT: 72 IN | SYSTOLIC BLOOD PRESSURE: 110 MMHG | BODY MASS INDEX: 29.96 KG/M2 | WEIGHT: 221.19 LBS | DIASTOLIC BLOOD PRESSURE: 69 MMHG | TEMPERATURE: 97 F

## 2021-10-12 PROCEDURE — 99214 OFFICE O/P EST MOD 30 MIN: CPT

## 2021-10-12 NOTE — PHYSICAL EXAM
[Fully active, able to carry on all pre-disease performance without restriction] : Status 0 - Fully active, able to carry on all pre-disease performance without restriction [Normal] : affect appropriate [de-identified] : LE edema - erthema

## 2021-10-12 NOTE — HISTORY OF PRESENT ILLNESS
[de-identified] : 52 year old female who is referred by Dr.Tobe Sanchez for initial consultation for newly diagnosed Waldenstrom macroglobulinemia.  \par She developed intermittent chest pressure and underwent laboratory testing which showed positive immunofixation for double clone of IgM.  \par She does not have the records but saw  underwent  .  She believes IgM is in the 8687-6305 range and the serum viscosity is slightly elevated.\par She denies weight loss, gum bleeding but has h/o chronic bruising, no neuropathy, imbalance or difficulty concentrating.  \par She has observed changes with her vision but didn’t see ophthalmologist.  \par She had h/o gastric bypass and has significant iron deficiency.   [FreeTextEntry1] : s/p IV iron [de-identified] : Patient presents today for follow up of WM having some neuropathy and fatigue

## 2021-10-12 NOTE — ASSESSMENT
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  \par \par She presented to PCP with intermitted chest discomfort and was found to have elevated alkaline phosphatase and it was followed by immunofixation which showed biclonal IgM-kappa.  \par Review of labs in [facility] showed elevated ALP dating back to August 2006 with value between 120 to 250 with normal bilirubin. \par Neuropathy - worsening, toes to above ankles. Neurology evaluation. MAG negative . \par \par Viscosity -2 , IgM 1555.\par Increased neuropathy - LE stocking distribution. Recent fall, sustained fracture left ankle. Obtain MRI. \par Consultation with Gerald Staples at [facility].  Biopsy of the nerve c/w neuropathy 2nd to IgM, increase level to above ankle.  Patient has indication for treatment with rituximab/ bendamustine. \par She wants to start the treatment in Jan 2021.\par Side effects and schema explained to patient. \par \par - bone marrow results reviewed with patient - 30% BM involvement with MYD 88 mutation present 3/2018\par 11/16/2020- cycle 1 - oz/ rituximab \par 1/13/2021- Cycle 2 Oz/ rituximab.  Delay b/o COVID- now recovering.  LE edema, erythema - order DOppler, check US r/o DVT.  Patient takes Eliquis 2.5 mg PO only once a day  \par \par PETCT - ALICIA 2020\par FKL - ratio 4.12, IgM 1634- stable\par \par MAG- negative \par Muscle cramping - stretching exercises \par \par S/p gastric bypass\par - s/p IV iron\par - stable iron level \par - Zinc- borderline level, copper- \par - Zinc Supplement \par \par Osteoporosis - 2.5.  \par last bone density 10/19 \par T-score -2.8\par Risk factors: postmenopausal, Waldenstrom macroglobulinemia. \par Recommended:\par 1. Vitamin D\par 2. Calcium supplement 500mg\par 3. Weight bearing exercises\par 4. Prolia 6/18, 12/18, Oct 2019-, June 2020, Jan 2021 - on hold b/o dental infection\par \par vit D level- 24 Dec 2019 \par - start 50 k vit D weekly\par - repeat level today \par \par 3/3/2021\par Patient underwent port placement yesterday\par Neutropenia -0.8\par Patient afebrile, prophylactic antibiotics. \par Neupogen 480 today\par Check CBC in 48h\par \par 3/5/2021\par , WBC 1.3\par Granix today and rx home as patient working \par Neutropenic precautions and diet\par Rx for vit B12 \par Unclear etiology of neutropenia\par Patient had Covid, s/p Convalescent plasma Jan 14, 2021, s/p remdesivir\par MIght require bone marrow biopsy if no recovery \par Hold treatment for now \par \par 3/8/2021\par ANC 1.08, WBC 3.25\par zarzio today \par feeling well\par s/p Augmentin \par continue with valacyclovir\par no evidence of infections\par return Thursday for labs\par \par 3/11/2021\par WBC 4.2, anc 2.4 no Neupogen today\par episode of questionable hematuria yesterday- patient had beets for lunch urinalysis negative\par pt feeling well\par repeat labs on Monday, if stable resume tx next week\par \par 4/15/2021\par Here for bendamustine/rituximab #3 \par IgM 625 Jan 2021- repeat today\par neuropathy - increased \par right achilles pain last week- subsided \par Neulasta on-pro\par \par 5/20/2021\par Infection viral last week, no fever recovering\par Oz/ rituximab #4 with Neulasta onpro\par Patient with neuropathy \par Monitor for toxicity \par Mourning from loss of family members\par \par 6/17/2021\par Oz/rituximab # 5 today \par insomnia post tx- trial of Ambien Istop checked \par constipation- colace\par slight increase of IgM- repeat today \par \par 8/17/2021\par S/p Covid vaccine few weeks ago, had reaction\par Episode of swollen legs- resolved\par Repeat IgM \par ANC 1500 \par Dental abscess - rx longer amoxicillin \par Neuropathy - decreased - periankle distribution \par \par 10/12/2021\par woke up with neuropathy in the last few weeks, increased tingling in the last few weeks in the legs,\par Feels neuropathy - more then before\par WBC 2.9, HG 12.4, ANC 1.5 \par \par \par \par \par \par  cbc, chem  Immunoglobulins, Waldenstrom panel-\par B12 , ferritin \par \par

## 2021-10-12 NOTE — CONSULT LETTER
[Dear  ___] : Dear  [unfilled], [Please see my note below.] : Please see my note below. [Consult Letter:] : I had the pleasure of evaluating your patient, [unfilled]. [Sincerely,] : Sincerely, [FreeTextEntry3] : Adilia Mireles MD\par [facility] [place] at [facility]\par

## 2021-11-04 ENCOUNTER — RESULT REVIEW (OUTPATIENT)
Age: 56
End: 2021-11-04

## 2021-11-04 ENCOUNTER — APPOINTMENT (OUTPATIENT)
Dept: OBGYN | Facility: CLINIC | Age: 56
End: 2021-11-04
Payer: COMMERCIAL

## 2021-11-04 VITALS
WEIGHT: 214 LBS | SYSTOLIC BLOOD PRESSURE: 112 MMHG | DIASTOLIC BLOOD PRESSURE: 74 MMHG | HEIGHT: 70 IN | BODY MASS INDEX: 30.64 KG/M2

## 2021-11-04 DIAGNOSIS — Z01.419 ENCOUNTER FOR GYNECOLOGICAL EXAMINATION (GENERAL) (ROUTINE) W/OUT ABNORMAL FINDINGS: ICD-10-CM

## 2021-11-04 PROCEDURE — 99396 PREV VISIT EST AGE 40-64: CPT

## 2021-11-04 RX ORDER — AMOXICILLIN 500 MG/1
500 CAPSULE ORAL TWICE DAILY
Qty: 60 | Refills: 3 | Status: DISCONTINUED | COMMUNITY
Start: 2021-08-17 | End: 2021-11-04

## 2021-11-08 ENCOUNTER — RESULT REVIEW (OUTPATIENT)
Age: 56
End: 2021-11-08

## 2022-01-18 ENCOUNTER — RESULT REVIEW (OUTPATIENT)
Age: 57
End: 2022-01-18

## 2022-01-18 ENCOUNTER — APPOINTMENT (OUTPATIENT)
Dept: HEMATOLOGY ONCOLOGY | Facility: CLINIC | Age: 57
End: 2022-01-18
Payer: COMMERCIAL

## 2022-01-18 VITALS
OXYGEN SATURATION: 97 % | HEART RATE: 82 BPM | SYSTOLIC BLOOD PRESSURE: 132 MMHG | DIASTOLIC BLOOD PRESSURE: 87 MMHG | WEIGHT: 221.44 LBS | RESPIRATION RATE: 16 BRPM | BODY MASS INDEX: 31.7 KG/M2 | HEIGHT: 70 IN | TEMPERATURE: 97.6 F

## 2022-01-18 PROCEDURE — 99214 OFFICE O/P EST MOD 30 MIN: CPT | Mod: 25

## 2022-01-18 PROCEDURE — 36415 COLL VENOUS BLD VENIPUNCTURE: CPT

## 2022-01-21 ENCOUNTER — APPOINTMENT (OUTPATIENT)
Dept: DISASTER EMERGENCY | Facility: CLINIC | Age: 57
End: 2022-01-21

## 2022-01-28 ENCOUNTER — NON-APPOINTMENT (OUTPATIENT)
Age: 57
End: 2022-01-28

## 2022-01-30 NOTE — HISTORY OF PRESENT ILLNESS
[de-identified] : 52 year old female who is referred by Dr.Tobe Sanchez for initial consultation for newly diagnosed Waldenstrom macroglobulinemia.  \par She developed intermittent chest pressure and underwent laboratory testing which showed positive immunofixation for double clone of IgM.  \par She does not have the records but saw  underwent  .  She believes IgM is in the 1385-6202 range and the serum viscosity is slightly elevated.\par She denies weight loss, gum bleeding but has h/o chronic bruising, no neuropathy, imbalance or difficulty concentrating.  \par She has observed changes with her vision but didn’t see ophthalmologist.  \par She had h/o gastric bypass and has significant iron deficiency.   [FreeTextEntry1] : s/p IV iron [de-identified] : Patient presents today for follow up of WM having some neuropathy and fatigue.\par Patient needs dental extraction.

## 2022-01-30 NOTE — ASSESSMENT
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  \par \par She presented to PCP with intermitted chest discomfort and was found to have elevated alkaline phosphatase and it was followed by immunofixation which showed biclonal IgM-kappa.  \par Review of labs in [facility] showed elevated ALP dating back to August 2006 with value between 120 to 250 with normal bilirubin. \par Neuropathy - worsening, toes to above ankles. Neurology evaluation. MAG negative . \par \par Viscosity -2 , IgM 1555.\par Increased neuropathy - LE stocking distribution. Recent fall, sustained fracture left ankle. Obtain MRI. \par Consultation with Gerald Staples at [facility].  Biopsy of the nerve c/w neuropathy 2nd to IgM, increase level to above ankle.  Patient has indication for treatment with rituximab/ bendamustine. \par She wants to start the treatment in Jan 2021.\par Side effects and schema explained to patient. \par \par - bone marrow results reviewed with patient - 30% BM involvement with MYD 88 mutation present 3/2018\par 11/2020- 6/2021- Bendamustine/ rituximab ( interrupted b/o COVID) \par \par - IgM decreased to 378, neuropathy slightly better\par - patient immunocompromised, h/o Covid, high rsk, offered Evusheld.  Side effects explained. \par \par MAG- negative \par Muscle cramping - stretching exercises \par \par S/p gastric bypass\par - s/p IV iron October 2021\par - Zinc  and Copper WNL\par \par Osteoporosis - 2.5.  \par last bone density 10/19 \par T-score -2.8\par Risk factors: postmenopausal, Waldenstrom macroglobulinemia. \par Recommended:\par 1. Vitamin D\par 2. Calcium supplement 500mg\par 3. Weight bearing exercises\par 4. Prolia 6/18, 12/18, Oct 2019-, June 2020, Jan 2021 - on hold b/o dental infection\par \par vit D level- 24 Dec 2019 \par - start 50 k vit D weekly\par - repeat level today \par \par \par  cbc, chem  Immunoglobulins, Waldenstrom panel-\par B12 , ferritin, zinc copper\par \par

## 2022-01-30 NOTE — CONSULT LETTER
[FreeTextEntry3] : Adilia Mireles MD\par [facility] [place] at [facility]\par

## 2022-03-01 ENCOUNTER — RESULT REVIEW (OUTPATIENT)
Age: 57
End: 2022-03-01

## 2022-03-01 ENCOUNTER — APPOINTMENT (OUTPATIENT)
Dept: HEMATOLOGY ONCOLOGY | Facility: CLINIC | Age: 57
End: 2022-03-01

## 2022-03-01 VITALS
DIASTOLIC BLOOD PRESSURE: 61 MMHG | BODY MASS INDEX: 32.21 KG/M2 | HEIGHT: 70 IN | RESPIRATION RATE: 18 BRPM | WEIGHT: 225 LBS | HEART RATE: 72 BPM | OXYGEN SATURATION: 100 % | TEMPERATURE: 97.8 F | SYSTOLIC BLOOD PRESSURE: 110 MMHG

## 2022-03-22 ENCOUNTER — TRANSCRIPTION ENCOUNTER (OUTPATIENT)
Age: 57
End: 2022-03-22

## 2022-03-22 ENCOUNTER — NON-APPOINTMENT (OUTPATIENT)
Age: 57
End: 2022-03-22

## 2022-03-22 DIAGNOSIS — U07.1 COVID-19: ICD-10-CM

## 2022-03-22 RX ORDER — AMOXICILLIN 500 MG/1
500 CAPSULE ORAL TWICE DAILY
Qty: 60 | Refills: 3 | Status: DISCONTINUED | COMMUNITY
Start: 2022-01-18 | End: 2022-03-22

## 2022-03-25 ENCOUNTER — APPOINTMENT (OUTPATIENT)
Dept: DISASTER EMERGENCY | Facility: CLINIC | Age: 57
End: 2022-03-25

## 2022-03-28 PROBLEM — Z00.00 ENCOUNTER FOR PREVENTIVE HEALTH EXAMINATION: Noted: 2022-03-28

## 2022-04-19 ENCOUNTER — RESULT REVIEW (OUTPATIENT)
Age: 57
End: 2022-04-19

## 2022-04-19 ENCOUNTER — APPOINTMENT (OUTPATIENT)
Dept: HEMATOLOGY ONCOLOGY | Facility: CLINIC | Age: 57
End: 2022-04-19
Payer: COMMERCIAL

## 2022-04-19 VITALS
TEMPERATURE: 98.2 F | RESPIRATION RATE: 18 BRPM | HEART RATE: 59 BPM | DIASTOLIC BLOOD PRESSURE: 69 MMHG | HEIGHT: 70 IN | WEIGHT: 225 LBS | OXYGEN SATURATION: 99 % | SYSTOLIC BLOOD PRESSURE: 124 MMHG | BODY MASS INDEX: 32.21 KG/M2

## 2022-04-19 DIAGNOSIS — D50.8 OTHER IRON DEFICIENCY ANEMIAS: ICD-10-CM

## 2022-04-19 PROCEDURE — 99213 OFFICE O/P EST LOW 20 MIN: CPT | Mod: 25

## 2022-04-19 PROCEDURE — 36415 COLL VENOUS BLD VENIPUNCTURE: CPT

## 2022-04-19 NOTE — CONSULT LETTER
[FreeTextEntry3] : Adilia Mireles MD\par [facility] [place] at [facility]\par

## 2022-04-19 NOTE — ASSESSMENT
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  \par \par She presented to PCP with intermitted chest discomfort and was found to have elevated alkaline phosphatase and it was followed by immunofixation which showed biclonal IgM-kappa.  \par Review of labs in [facility] showed elevated ALP dating back to August 2006 with value between 120 to 250 with normal bilirubin. \par Neuropathy - worsening, toes to above ankles. Neurology evaluation. MAG negative . \par \par Viscosity 1.6 , IgM 345.\par Increased neuropathy - LE stocking distribution. Recent fall, sustained fracture left ankle. Obtain MRI. \par Consultation with Gerald Staples at [facility].  Biopsy of the nerve c/w neuropathy 2nd to IgM, increase level to above ankle.  Patient has indication for treatment with rituximab/ bendamustine. \par She wants to start the treatment in Jan 2021.\par Side effects and schema explained to patient. \par \par - bone marrow results reviewed with patient - 30% BM involvement with MYD 88 mutation present 3/2018\par 11/2020- 6/2021- Bendamustine/ rituximab ( interrupted b/o COVID) \par \par - IgM decreased to 345, neuropathy slightly better\par - patient immunocompromised, h/o Covid, high rsk, offered Evusheld.  Side effects explained. S/p COVID infection April 2022\par \par MAG- negative \par Muscle cramping - stretching exercises \par \par S/p gastric bypass\par - s/p IV iron October 2021\par - Zinc  and Copper WNL\par \par Osteoporosis - 2.5.  \par last bone density 11/2021\par T-score -2.6\par Risk factors: postmenopausal, Waldenstrom macroglobulinemia. \par Recommended:\par 1. Vitamin D\par 2. Calcium supplement 500mg\par 3. Weight bearing exercises\par 4. Prolia 6/18, 12/18, Oct 2019-, June 2020, April 2022\par \par vit D level- 24 Dec 2019 \par - start 50 k vit D weekly\par - repeat level today \par \par Labs ordered, drawn today- reviewed \par \par  cbc, chem  Immunoglobulins, Waldenstrom panel-\par B12 , ferritin, zinc copper, case and mgmt discussed with Dr. Mireles - return in 3 months \par \par

## 2022-04-19 NOTE — DISCUSSION/SUMMARY
[FreeTextEntry1] : Patient diagnosed with COVID again- since she is immunocompromised pt prescribed Molnupiravir 800mg po q 12 hrs x 5 days.  She will beign taking tomorrow if she is not feeling well- Discussed with Dr. Mireles

## 2022-04-19 NOTE — HISTORY OF PRESENT ILLNESS
[de-identified] : 52 year old female who is referred by Dr.Tobe Sanchez for initial consultation for newly diagnosed Waldenstrom macroglobulinemia.  \par She developed intermittent chest pressure and underwent laboratory testing which showed positive immunofixation for double clone of IgM.  \par She does not have the records but saw  underwent  .  She believes IgM is in the 3366-6581 range and the serum viscosity is slightly elevated.\par She denies weight loss, gum bleeding but has h/o chronic bruising, no neuropathy, imbalance or difficulty concentrating.  \par She has observed changes with her vision but didn’t see ophthalmologist.  \par She had h/o gastric bypass and has significant iron deficiency.   [FreeTextEntry1] : s/p IV iron [de-identified] : Patient presents today for follow up of WM having some neuropathy and fatigue.\par

## 2022-05-20 ENCOUNTER — APPOINTMENT (OUTPATIENT)
Dept: DISASTER EMERGENCY | Facility: CLINIC | Age: 57
End: 2022-05-20

## 2022-05-24 ENCOUNTER — APPOINTMENT (OUTPATIENT)
Dept: INTERNAL MEDICINE | Facility: CLINIC | Age: 57
End: 2022-05-24
Payer: COMMERCIAL

## 2022-05-24 VITALS
TEMPERATURE: 97.2 F | HEART RATE: 59 BPM | OXYGEN SATURATION: 99 % | WEIGHT: 225 LBS | DIASTOLIC BLOOD PRESSURE: 60 MMHG | BODY MASS INDEX: 32.21 KG/M2 | HEIGHT: 70 IN | SYSTOLIC BLOOD PRESSURE: 99 MMHG

## 2022-05-24 DIAGNOSIS — I71.4 ABDOMINAL AORTIC ANEURYSM, W/OUT RUPTURE: ICD-10-CM

## 2022-05-24 DIAGNOSIS — K76.0 FATTY (CHANGE OF) LIVER, NOT ELSEWHERE CLASSIFIED: ICD-10-CM

## 2022-05-24 PROCEDURE — 99204 OFFICE O/P NEW MOD 45 MIN: CPT

## 2022-05-26 ENCOUNTER — RESULT REVIEW (OUTPATIENT)
Age: 57
End: 2022-05-26

## 2022-05-26 PROBLEM — K76.0 FATTY LIVER: Status: ACTIVE | Noted: 2022-05-24

## 2022-05-26 NOTE — HISTORY OF PRESENT ILLNESS
[FreeTextEntry1] : 1. reestablish care after 4 years  [de-identified] : 1. still being actively observed for non Hodgkins lymphoma , last chemo one year ago under the care of dr. Mireles

## 2022-07-19 ENCOUNTER — RESULT REVIEW (OUTPATIENT)
Age: 57
End: 2022-07-19

## 2022-07-19 ENCOUNTER — APPOINTMENT (OUTPATIENT)
Dept: HEMATOLOGY ONCOLOGY | Facility: CLINIC | Age: 57
End: 2022-07-19

## 2022-07-19 VITALS
BODY MASS INDEX: 33.36 KG/M2 | RESPIRATION RATE: 18 BRPM | TEMPERATURE: 97 F | WEIGHT: 233 LBS | OXYGEN SATURATION: 100 % | SYSTOLIC BLOOD PRESSURE: 113 MMHG | HEIGHT: 70 IN | HEART RATE: 67 BPM | DIASTOLIC BLOOD PRESSURE: 68 MMHG

## 2022-07-19 DIAGNOSIS — D70.8 OTHER NEUTROPENIA: ICD-10-CM

## 2022-07-19 PROCEDURE — 36415 COLL VENOUS BLD VENIPUNCTURE: CPT

## 2022-07-19 PROCEDURE — 99214 OFFICE O/P EST MOD 30 MIN: CPT | Mod: 25

## 2022-07-19 RX ORDER — MOLNUPIRAVIR 200 MG/1
200 CAPSULE ORAL
Qty: 40 | Refills: 0 | Status: COMPLETED | COMMUNITY
Start: 2022-03-22 | End: 2022-07-19

## 2022-07-19 RX ORDER — LIDOCAINE AND PRILOCAINE 25; 25 MG/G; MG/G
2.5-2.5 CREAM TOPICAL
Qty: 1 | Refills: 5 | Status: COMPLETED | COMMUNITY
Start: 2021-03-02 | End: 2022-07-19

## 2022-07-19 RX ORDER — HYDROCODONE BITARTRATE AND ACETAMINOPHEN 5; 325 MG/1; MG/1
5-325 TABLET ORAL
Qty: 12 | Refills: 0 | Status: COMPLETED | COMMUNITY
Start: 2022-02-09

## 2022-07-19 RX ORDER — TBO-FILGRASTIM 480 UG/.8ML
480 INJECTION, SOLUTION SUBCUTANEOUS DAILY
Qty: 10 | Refills: 0 | Status: COMPLETED | COMMUNITY
Start: 2021-03-05 | End: 2022-07-19

## 2022-07-19 RX ORDER — ZOLPIDEM TARTRATE 5 MG/1
5 TABLET ORAL
Qty: 20 | Refills: 0 | Status: COMPLETED | COMMUNITY
Start: 2021-06-17 | End: 2022-07-19

## 2022-07-19 RX ORDER — ONDANSETRON 4 MG/1
4 TABLET ORAL EVERY 6 HOURS
Qty: 20 | Refills: 0 | Status: COMPLETED | COMMUNITY
Start: 2020-11-16 | End: 2022-07-19

## 2022-07-19 NOTE — ASSESSMENT
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  \par \par She presented to PCP with intermitted chest discomfort and was found to have elevated alkaline phosphatase and it was followed by immunofixation which showed biclonal IgM-kappa.  \par Review of labs in [facility] showed elevated ALP dating back to August 2006 with value between 120 to 250 with normal bilirubin. \par Neuropathy - worsening, toes to above ankles. Neurology evaluation. MAG negative . \par \par Viscosity 1.6 , IgM 345.\par Increased neuropathy - LE stocking distribution. Recent fall, sustained fracture left ankle. Obtain MRI. \par Consultation with Gerald Staples at [facility].  Biopsy of the nerve c/w neuropathy 2nd to IgM, increase level to above ankle.  Patient has indication for treatment with rituximab/ bendamustine. \par She wants to start the treatment in Jan 2021.\par Side effects and schema explained to patient. \par \par - bone marrow results reviewed with patient - 30% BM involvement with MYD 88 mutation present 3/2018\par 11/2020- 6/2021- Bendamustine/ rituximab ( interrupted b/o COVID) \par \par - IgM decreased to 345, neuropathy slightly better\par - patient immunocompromised, h/o Covid, high risk, offered Evusheld.  Side effects explained. S/p COVID infection April 2022\par \par MAG- negative \par Muscle cramping - stretching exercises \par \par S/p gastric bypass, increased weight \par - s/p IV iron October 2021\par - Zinc  and Copper WNL\par \par Osteoporosis - 2.5.  \par last bone density 11/2021\par T-score -2.6\par Risk factors: postmenopausal, Waldenstrom macroglobulinemia. \par Recommended:\par 1. Vitamin D\par 2. Calcium supplement 500mg\par 3. Weight bearing exercises\par 4. Prolia 6/18, 12/18, Oct 2019-, June 2020, April 2022\par \par vit D level- 24 Dec 2019 \par - start 50 k vit D weekly\par - repeat level today \par \par HA \par - neurology evaluation \par \par Labs ordered, drawn today- reviewed \par \par \par \par \par  cbc, chem  Immunoglobulins, Waldenstrom panel-\par B12 , ferritin, zinc copper, case and mgmt discussed with Dr. Mireles - return in 3 months \par \par

## 2022-07-19 NOTE — PHYSICAL EXAM
[Fully active, able to carry on all pre-disease performance without restriction] : Status 0 - Fully active, able to carry on all pre-disease performance without restriction [Normal] : affect appropriate [de-identified] : LE edema - erthema

## 2022-07-19 NOTE — HISTORY OF PRESENT ILLNESS
[de-identified] : 52 year old female who is referred by Dr.Tobe Sanchez for initial consultation for newly diagnosed Waldenstrom macroglobulinemia.  \par She developed intermittent chest pressure and underwent laboratory testing which showed positive immunofixation for double clone of IgM.  \par She does not have the records but saw  underwent  .  She believes IgM is in the 1518-6873 range and the serum viscosity is slightly elevated.\par She denies weight loss, gum bleeding but has h/o chronic bruising, no neuropathy, imbalance or difficulty concentrating.  \par She has observed changes with her vision but didn’t see ophthalmologist.  \par She had h/o gastric bypass and has significant iron deficiency.   [FreeTextEntry1] : s/p IV iron [de-identified] : Patient presents today for follow up of WM having some neuropathy and fatigue.\par

## 2022-07-19 NOTE — CONSULT LETTER
[Dear  ___] : Dear  [unfilled], [Consult Letter:] : I had the pleasure of evaluating your patient, [unfilled]. [Please see my note below.] : Please see my note below. [Sincerely,] : Sincerely, [FreeTextEntry3] : Adilia Mireles MD\par [facility] [place] at [facility]\par

## 2022-07-20 ENCOUNTER — APPOINTMENT (OUTPATIENT)
Dept: INTERNAL MEDICINE | Facility: CLINIC | Age: 57
End: 2022-07-20

## 2022-08-26 ENCOUNTER — APPOINTMENT (OUTPATIENT)
Dept: DISASTER EMERGENCY | Facility: CLINIC | Age: 57
End: 2022-08-26

## 2022-10-10 DIAGNOSIS — G62.9 POLYNEUROPATHY, UNSPECIFIED: ICD-10-CM

## 2022-10-18 ENCOUNTER — RESULT REVIEW (OUTPATIENT)
Age: 57
End: 2022-10-18

## 2022-10-18 ENCOUNTER — APPOINTMENT (OUTPATIENT)
Dept: HEMATOLOGY ONCOLOGY | Facility: CLINIC | Age: 57
End: 2022-10-18

## 2022-10-18 VITALS
TEMPERATURE: 97.5 F | HEIGHT: 70 IN | BODY MASS INDEX: 35.16 KG/M2 | SYSTOLIC BLOOD PRESSURE: 122 MMHG | RESPIRATION RATE: 16 BRPM | WEIGHT: 245.6 LBS | HEART RATE: 68 BPM | DIASTOLIC BLOOD PRESSURE: 69 MMHG | OXYGEN SATURATION: 100 %

## 2022-10-18 PROCEDURE — 36415 COLL VENOUS BLD VENIPUNCTURE: CPT

## 2022-10-18 PROCEDURE — 99214 OFFICE O/P EST MOD 30 MIN: CPT | Mod: 25

## 2022-10-18 NOTE — PHYSICAL EXAM
[Fully active, able to carry on all pre-disease performance without restriction] : Status 0 - Fully active, able to carry on all pre-disease performance without restriction [Normal] : affect appropriate [de-identified] : LE edema - erthema

## 2022-10-18 NOTE — CONSULT LETTER
[Dear  ___] : Dear  [unfilled], [Consult Letter:] : I had the pleasure of evaluating your patient, [unfilled]. [Please see my note below.] : Please see my note below. [Sincerely,] : Sincerely, [FreeTextEntry3] : Adilia Mireles MD\par [facility] [place] at [facility]\par

## 2022-10-18 NOTE — HISTORY OF PRESENT ILLNESS
[de-identified] : 52 year old female who is referred by Dr.Tobe Sanchez for initial consultation for newly diagnosed Waldenstrom macroglobulinemia.  \par She developed intermittent chest pressure and underwent laboratory testing which showed positive immunofixation for double clone of IgM.  \par She does not have the records but saw  underwent  .  She believes IgM is in the 2589-9054 range and the serum viscosity is slightly elevated.\par She denies weight loss, gum bleeding but has h/o chronic bruising, no neuropathy, imbalance or difficulty concentrating.  \par She has observed changes with her vision but didn’t see ophthalmologist.  \par She had h/o gastric bypass and has significant iron deficiency.   [FreeTextEntry1] : s/p IV iron [de-identified] : Patient presents today for follow up of WM having some neuropathy and fatigue.\par

## 2022-10-18 NOTE — ASSESSMENT
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 \par \par She presented to PCP with intermitted chest discomfort and was found to have elevated alkaline phosphatase and it was followed by immunofixation which showed biclonal IgM-kappa. \par Review of labs in [facility] showed elevated ALP dating back to August 2006 with value between 120 to 250 with normal bilirubin. \par Neuropathy - worsening, toes to above ankles. Neurology evaluation. MAG negative. \par \par Viscosity 1.6 , IgM 345.\par Increased neuropathy - LE stocking distribution. Recent fall, sustained fracture left ankle. Obtain MRI. \par Consultation with Gerald Staples at [facility]. Biopsy of the nerve c/w neuropathy 2nd to IgM, increase level to above ankle. Patient has indication for treatment with rituximab/ bendamustine. \par She wants to start the treatment in Jan 2021.\par Side effects and schema explained to patient. \par \par - bone marrow results reviewed with patient - 30% BM involvement with MYD 88 mutation present 3/2018\par 11/2020- 6/2021- Bendamustine/ rituximab ( interrupted b/o COVID) \par \par - IgM decreased to 345, neuropathy slightly better\par - patient immunocompromised, h/o Covid, high risk, offered Evusheld. Side effects explained. S/p COVID infection April 2022. Schedule Covid vaccine\par \par MAG- negative \par Muscle cramping - stretching exercises \par \par S/p gastric bypass, increased weight \par - s/p IV iron October 2021\par - Zinc and Copper WNL\par \par Osteoporosis - 2.5. \par last bone density 11/2021\par T-score -2.6\par Risk factors: postmenopausal, Waldenstrom macroglobulinemia. \par Recommended:\par 1. Vitamin D\par 2. Calcium supplement 500mg\par 3. Weight bearing exercises\par 4. Prolia 6/18, 12/18, Oct 2019-, June 2020, April 2022, October 2022\par \par vit D level- 24 Dec 2019 \par - start 50 k vit D weekly\par - repeat level today \par \par HA \par - neurology evaluation \par \par \par Remove mediport 10/22\par Labs ordered, drawn today- reviewed \par \par

## 2022-10-18 NOTE — REVIEW OF SYSTEMS
[Fatigue] : fatigue [Lower Ext Edema] : lower extremity edema [Negative] : Neurological [Palpitations] : no palpitations [Muscle Pain] : no muscle pain [FreeTextEntry2] : improve [FreeTextEntry5] : intermittent  [FreeTextEntry9] : muscle cramping bilateral legs [de-identified] : neuropathy in her feet

## 2022-10-26 ENCOUNTER — APPOINTMENT (OUTPATIENT)
Dept: NEUROLOGY | Facility: CLINIC | Age: 57
End: 2022-10-26

## 2022-10-26 VITALS
OXYGEN SATURATION: 98 % | DIASTOLIC BLOOD PRESSURE: 80 MMHG | HEIGHT: 72 IN | WEIGHT: 235 LBS | HEART RATE: 83 BPM | TEMPERATURE: 98 F | BODY MASS INDEX: 31.83 KG/M2 | SYSTOLIC BLOOD PRESSURE: 140 MMHG

## 2022-10-26 PROCEDURE — 99244 OFF/OP CNSLTJ NEW/EST MOD 40: CPT

## 2022-10-26 RX ORDER — BACILLUS COAGULANS/INULIN 1B-250 MG
CAPSULE ORAL
Refills: 0 | Status: DISCONTINUED | COMMUNITY
End: 2022-10-26

## 2022-10-26 RX ORDER — DOCUSATE SODIUM 100 MG/1
CAPSULE ORAL
Refills: 0 | Status: DISCONTINUED | COMMUNITY
End: 2022-10-26

## 2022-10-26 RX ORDER — RIZATRIPTAN BENZOATE 10 MG/1
10 TABLET, ORALLY DISINTEGRATING ORAL
Qty: 12 | Refills: 5 | Status: ACTIVE | COMMUNITY
Start: 2022-10-26 | End: 1900-01-01

## 2022-10-31 NOTE — CONSULT LETTER
[Dear  ___] : Dear  [unfilled], [Consult Letter:] : I had the pleasure of evaluating your patient, [unfilled]. [Please see my note below.] : Please see my note below. [FreeTextEntry3] : Sincerely,\par \par Radha Montemayor M.D.\par

## 2022-10-31 NOTE — PHYSICAL EXAM
[FreeTextEntry1] : Physical examination \par General: No acute distress, Awake, Alert. \par \par Mental status \par Awake, alert, and oriented to person, time and place, Normal attention span and concentration, Recent and remote memory intact, Language intact, Fund of knowledge intact. \par Cranial Nerves \par II: VFF \par III, IV, VI: PERRL, EOMI. \par V: Facial sensation is normal B/L. \par VII: Facial strength is normal B/L. \par VIII: Gross hearing is intact. \par IX, X: Palate is midline and elevates symmetrically. \par XI: Trapezius normal strength. \par XII: Tongue midline without atrophy or fasciculations. \par \par Motor exam \par Muscle tone - no evidence of rigidity or resistance in all 4 extremities. \par No atrophy or fasciculations \par Muscle Strength: arms and legs, proximal and distal flexors and extensors are normal \par No UE drift.\par \par Reflexes \par All present, normal, and symmetrical. \par Plantars right: mute. \par Plantars left: mute. \par Absent ankle jerks. \par \par Coordination \par Finger to nose: Normal. \par Heel to shin: Normal. \par \par Sensory\par impaired vibration\par \par Gait \par Normal\par

## 2022-10-31 NOTE — HISTORY OF PRESENT ILLNESS
[FreeTextEntry1] : This is a 56 y/o woman who is being seen in consultation for headaches.\par \par Finished chemotherapy for the Waldenstroms\par neuropathy better after treatment \par \par Notes daily headaches- these are not migraines\par unilateral or bilateral frontal \par occasional nausea but no vomiting (h/o gastric bypass)\par avoids Excedrin \par light and sound bother her \par needs to lay down \par ice helps - for severe ha Excedrin helps \par no auras - \par \par can wake up in the middle of the night with headache - not new \par \par Migraines tend to be much worse in severity - last one was \par had to go to ED \par cannot lift head\par needs black room \par has difficulty expressing self \par 5 years ago\par \par Finished menopause at age 43\par \par one melatonin at night (helps her sleep)\par \par Teaching nursing now.

## 2022-10-31 NOTE — ASSESSMENT
[FreeTextEntry1] : Begin Topiramate 25 mg \par \par Side effects include, but are not limited to, decreased appetite, weight loss, numbness and tingling in the extremities, increased risk of kidney stones, word finding difficulties, and fatigue.\par \par 2 L water daily \par Ha diary\par \par Rescue \par Rizatriptan 10 mg ODT \par

## 2022-11-01 ENCOUNTER — NON-APPOINTMENT (OUTPATIENT)
Age: 57
End: 2022-11-01

## 2022-11-02 ENCOUNTER — TRANSCRIPTION ENCOUNTER (OUTPATIENT)
Age: 57
End: 2022-11-02

## 2022-11-03 ENCOUNTER — NON-APPOINTMENT (OUTPATIENT)
Age: 57
End: 2022-11-03

## 2022-11-14 ENCOUNTER — APPOINTMENT (OUTPATIENT)
Dept: GASTROENTEROLOGY | Facility: CLINIC | Age: 57
End: 2022-11-14

## 2022-11-16 ENCOUNTER — APPOINTMENT (OUTPATIENT)
Dept: FAMILY MEDICINE | Facility: CLINIC | Age: 57
End: 2022-11-16

## 2022-11-16 VITALS
DIASTOLIC BLOOD PRESSURE: 80 MMHG | SYSTOLIC BLOOD PRESSURE: 110 MMHG | WEIGHT: 225 LBS | HEIGHT: 72 IN | BODY MASS INDEX: 30.48 KG/M2 | OXYGEN SATURATION: 97 % | HEART RATE: 70 BPM

## 2022-11-16 DIAGNOSIS — R79.89 OTHER SPECIFIED ABNORMAL FINDINGS OF BLOOD CHEMISTRY: ICD-10-CM

## 2022-11-16 DIAGNOSIS — Z09 ENCOUNTER FOR FOLLOW-UP EXAMINATION AFTER COMPLETED TREATMENT FOR CONDITIONS OTHER THAN MALIGNANT NEOPLASM: ICD-10-CM

## 2022-11-16 DIAGNOSIS — R10.13 EPIGASTRIC PAIN: ICD-10-CM

## 2022-11-16 PROCEDURE — 99213 OFFICE O/P EST LOW 20 MIN: CPT | Mod: 25

## 2022-11-16 PROCEDURE — 36415 COLL VENOUS BLD VENIPUNCTURE: CPT

## 2022-11-16 RX ORDER — ERGOCALCIFEROL 1.25 MG/1
1.25 MG CAPSULE, LIQUID FILLED ORAL
Qty: 12 | Refills: 3 | Status: DISCONTINUED | COMMUNITY
End: 2022-11-16

## 2022-11-16 RX ORDER — CYANOCOBALAMIN 1000 UG/ML
1000 INJECTION INTRAMUSCULAR; SUBCUTANEOUS
Qty: 1 | Refills: 1 | Status: DISCONTINUED | COMMUNITY
Start: 2021-03-05 | End: 2022-11-16

## 2022-11-16 NOTE — HISTORY OF PRESENT ILLNESS
[Admitted on: ___] : The patient was admitted on [unfilled] [Discharged on ___] : discharged on [unfilled] [Post-hospitalization from ___ Hospital] : Post-hospitalization from [unfilled] Hospital [FreeTextEntry2] : Admitted for bacteremia sec to UTI. Received IV antibiotics. Had some elevated LFTs in the hospital.\par c/o some epigastric discomfort for several days. Has had esophageal spasm in the past.

## 2022-11-16 NOTE — HEALTH RISK ASSESSMENT
[Former] : Former [Yes] : Yes [2 - 4 times a month (2 pts)] : 2-4 times a month (2 points) [1 or 2 (0 pts)] : 1 or 2 (0 points) [Never (0 pts)] : Never (0 points) [No] : In the past 12 months have you used drugs other than those required for medical reasons? No [No falls in past year] : Patient reported no falls in the past year [0] : 2) Feeling down, depressed, or hopeless: Not at all (0) [PHQ-2 Negative - No further assessment needed] : PHQ-2 Negative - No further assessment needed [YearQuit] : 1990 [de-identified] : walking [de-identified] : normal

## 2022-11-18 LAB
ALBUMIN SERPL ELPH-MCNC: 4.9 G/DL
ALP BLD-CCNC: 120 U/L
ALT SERPL-CCNC: 32 U/L
ANION GAP SERPL CALC-SCNC: 14 MMOL/L
APPEARANCE: CLEAR
AST SERPL-CCNC: 22 U/L
BILIRUB SERPL-MCNC: 0.4 MG/DL
BILIRUBIN URINE: NEGATIVE
BLOOD URINE: NEGATIVE
BUN SERPL-MCNC: 21 MG/DL
CALCIUM SERPL-MCNC: 10.4 MG/DL
CHLORIDE SERPL-SCNC: 103 MMOL/L
CO2 SERPL-SCNC: 22 MMOL/L
COLOR: YELLOW
CREAT SERPL-MCNC: 0.8 MG/DL
EGFR: 85 ML/MIN/1.73M2
GLUCOSE QUALITATIVE U: NEGATIVE
GLUCOSE SERPL-MCNC: 99 MG/DL
KETONES URINE: NEGATIVE
LEUKOCYTE ESTERASE URINE: NEGATIVE
NITRITE URINE: NEGATIVE
PH URINE: 6
POTASSIUM SERPL-SCNC: 4.1 MMOL/L
PROT SERPL-MCNC: 7.5 G/DL
PROTEIN URINE: NORMAL
SODIUM SERPL-SCNC: 139 MMOL/L
SPECIFIC GRAVITY URINE: 1.03
UROBILINOGEN URINE: NORMAL

## 2022-11-23 ENCOUNTER — APPOINTMENT (OUTPATIENT)
Dept: INTERNAL MEDICINE | Facility: CLINIC | Age: 57
End: 2022-11-23

## 2022-12-02 ENCOUNTER — APPOINTMENT (OUTPATIENT)
Dept: DISASTER EMERGENCY | Facility: CLINIC | Age: 57
End: 2022-12-02

## 2023-01-23 ENCOUNTER — RESULT REVIEW (OUTPATIENT)
Age: 58
End: 2023-01-23

## 2023-01-23 ENCOUNTER — APPOINTMENT (OUTPATIENT)
Dept: HEMATOLOGY ONCOLOGY | Facility: CLINIC | Age: 58
End: 2023-01-23
Payer: COMMERCIAL

## 2023-01-23 VITALS
HEART RATE: 73 BPM | TEMPERATURE: 97 F | BODY MASS INDEX: 31.7 KG/M2 | DIASTOLIC BLOOD PRESSURE: 70 MMHG | HEIGHT: 72 IN | SYSTOLIC BLOOD PRESSURE: 111 MMHG | OXYGEN SATURATION: 98 % | WEIGHT: 234.06 LBS | RESPIRATION RATE: 16 BRPM

## 2023-01-23 DIAGNOSIS — E66.9 OBESITY, UNSPECIFIED: ICD-10-CM

## 2023-01-23 PROCEDURE — 99214 OFFICE O/P EST MOD 30 MIN: CPT | Mod: 25

## 2023-01-23 PROCEDURE — 36415 COLL VENOUS BLD VENIPUNCTURE: CPT

## 2023-01-23 NOTE — CONSULT LETTER
[Dear  ___] : Dear  [unfilled], [Consult Letter:] : I had the pleasure of evaluating your patient, [unfilled]. [Please see my note below.] : Please see my note below. [Sincerely,] : Sincerely, [FreeTextEntry3] : Adilia Mireles MD\par [facility] [place] at [facility]\par

## 2023-01-23 NOTE — HISTORY OF PRESENT ILLNESS
[de-identified] : 52 year old female who is referred by Dr.Tobe Sanchez for initial consultation for newly diagnosed Waldenstrom macroglobulinemia.  \par She developed intermittent chest pressure and underwent laboratory testing which showed positive immunofixation for double clone of IgM.  \par She does not have the records but saw  underwent  .  She believes IgM is in the 1889-3893 range and the serum viscosity is slightly elevated.\par She denies weight loss, gum bleeding but has h/o chronic bruising, no neuropathy, imbalance or difficulty concentrating.  \par She has observed changes with her vision but didn’t see ophthalmologist.  \par She had h/o gastric bypass and has significant iron deficiency.   [FreeTextEntry1] : s/p IV iron [de-identified] : Patient presents today for follow up of WM having some neuropathy and fatigue.\par

## 2023-01-23 NOTE — REVIEW OF SYSTEMS
[Fatigue] : fatigue [Lower Ext Edema] : lower extremity edema [Negative] : Allergic/Immunologic [Palpitations] : no palpitations [Muscle Pain] : no muscle pain [FreeTextEntry2] : improve [FreeTextEntry5] : intermittent  [FreeTextEntry9] : muscle cramping bilateral legs [de-identified] : neuropathy in her feet

## 2023-01-23 NOTE — PHYSICAL EXAM
[Fully active, able to carry on all pre-disease performance without restriction] : Status 0 - Fully active, able to carry on all pre-disease performance without restriction [Normal] : affect appropriate [de-identified] : LE edema - erthema

## 2023-01-23 NOTE — ASSESSMENT
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  \par \par She presented to PCP with intermitted chest discomfort and was found to have elevated alkaline phosphatase and it was followed by immunofixation which showed biclonal IgM-kappa.  \par Review of labs in [facility] showed elevated ALP dating back to August 2006 with value between 120 to 250 with normal bilirubin. \par Neuropathy - worsening, toes to above ankles. Neurology evaluation. MAG negative. \par \par Viscosity 1.6 , IgM 345.\par Increased neuropathy - LE stocking distribution. Recent fall, sustained fracture left ankle. Obtain MRI. \par Consultation with Gerald Staples at [facility].  Biopsy of the nerve c/w neuropathy 2nd to IgM, increase level to above ankle.  Patient has indication for treatment with rituximab/ bendamustine. \par She wants to start the treatment in Jan 2021.\par Side effects and schema explained to patient. \par \par - bone marrow results reviewed with patient - 30% BM involvement with MYD 88 mutation present 3/2018\par 11/2020- 6/2021- Bendamustine/ rituximab ( interrupted b/o COVID) \par \par - IgM decreased to 345, neuropathy slightly better\par - patient immunocompromised, h/o Covid, high risk, offered Evusheld.  Side effects explained. S/p COVID infection April 2022\par \par MAG- negative \par Muscle cramping - stretching exercises \par \par S/p gastric bypass, increased weight \par - s/p IV iron October 2021\par - Zinc  and Copper WNL\par \par Osteoporosis - 2.5.  \par last bone density 11/2021\par T-score -2.6\par Risk factors: postmenopausal, Waldenstrom macroglobulinemia. \par Recommended:\par 1. Vitamin D\par 2. Calcium supplement 500mg\par 3. Weight bearing exercises\par 4. Prolia 6/18, 12/18, Oct 2019-, June 2020, April 2022, 10/22\par \par vit D level- 24 Dec 2019 \par - start 50 k vit D weekly\par - repeat level today \par \par HA \par - neurology evaluation \par \par Vitamin B12 \par - resume SQ\par \par Labs ordered, drawn today- reviewed \par \par cbc, chem  Immunoglobulins, Waldenstrom panel-\par B12 , ferritin, zinc copper, return in 3 months \par \par

## 2023-02-24 ENCOUNTER — NON-APPOINTMENT (OUTPATIENT)
Age: 58
End: 2023-02-24

## 2023-02-24 ENCOUNTER — APPOINTMENT (OUTPATIENT)
Dept: GASTROENTEROLOGY | Facility: CLINIC | Age: 58
End: 2023-02-24
Payer: COMMERCIAL

## 2023-02-24 VITALS
HEART RATE: 62 BPM | RESPIRATION RATE: 18 BRPM | OXYGEN SATURATION: 98 % | SYSTOLIC BLOOD PRESSURE: 120 MMHG | WEIGHT: 225 LBS | DIASTOLIC BLOOD PRESSURE: 80 MMHG | HEIGHT: 72 IN | BODY MASS INDEX: 30.48 KG/M2

## 2023-02-24 DIAGNOSIS — K21.9 GASTRO-ESOPHAGEAL REFLUX DISEASE W/OUT ESOPHAGITIS: ICD-10-CM

## 2023-02-24 PROCEDURE — 99215 OFFICE O/P EST HI 40 MIN: CPT

## 2023-02-24 NOTE — ASSESSMENT
[FreeTextEntry1] : 1. GERD / esophageal spasms: dietary and lifestyle modification. Repeat EGD\par \par 2. Constipation with occasional diarrhea:  suspect IBS .  Colonoscopy with random biopsies planned\par \par Pertinent available records reviewed\par Risks of the procedures including but not limited to bleeding / perforation / infection / anesthesia complication / missed  lesions explained to the  patient . The patient expressed understanding and a desire to proceed with the procedures.\par \par Risk of not doing procedures includes but is not limited to missed or delayed diagnosis of gastric pathology, colon cancer or other gastrointestinal pathology\par \par The patient is at increased risk of anesthesia / procedure related complications  secondary to elevated BMI\par

## 2023-03-07 ENCOUNTER — APPOINTMENT (OUTPATIENT)
Dept: NEUROLOGY | Facility: CLINIC | Age: 58
End: 2023-03-07
Payer: COMMERCIAL

## 2023-03-07 VITALS
DIASTOLIC BLOOD PRESSURE: 73 MMHG | WEIGHT: 220 LBS | BODY MASS INDEX: 29.8 KG/M2 | HEART RATE: 64 BPM | HEIGHT: 72 IN | OXYGEN SATURATION: 99 % | SYSTOLIC BLOOD PRESSURE: 112 MMHG

## 2023-03-07 DIAGNOSIS — M62.838 OTHER MUSCLE SPASM: ICD-10-CM

## 2023-03-07 DIAGNOSIS — M54.2 CERVICALGIA: ICD-10-CM

## 2023-03-07 DIAGNOSIS — R25.2 CRAMP AND SPASM: ICD-10-CM

## 2023-03-07 DIAGNOSIS — G44.221 CHRONIC TENSION-TYPE HEADACHE, INTRACTABLE: ICD-10-CM

## 2023-03-07 PROCEDURE — 99215 OFFICE O/P EST HI 40 MIN: CPT

## 2023-03-07 RX ORDER — RIZATRIPTAN BENZOATE 10 MG/1
10 TABLET ORAL
Qty: 12 | Refills: 0 | Status: COMPLETED | COMMUNITY
Start: 2022-10-26 | End: 2023-03-07

## 2023-03-08 PROBLEM — M54.2 NECK PAIN, ACUTE: Status: ACTIVE | Noted: 2023-03-07

## 2023-03-08 PROBLEM — R25.2 MUSCLE CRAMPING: Status: ACTIVE | Noted: 2020-03-04

## 2023-03-08 NOTE — REASON FOR VISIT
[Follow-Up: _____] : a [unfilled] follow-up visit [FreeTextEntry1] : pt states she has headaches, and will like to discuss medication topamax

## 2023-03-08 NOTE — PHYSICAL EXAM
[FreeTextEntry1] : Physical examination \par General: No acute distress, Awake, Alert. \par dec rom towards the right \par bilateral trapezius spasm\par \par Mental status \par Awake, alert, and oriented to person, time and place, Normal attention span and concentration, Recent and remote memory intact, Language intact, Fund of knowledge intact. \par Cranial Nerves \par II: VFF \par III, IV, VI: PERRL, EOMI. \par V: Facial sensation is normal B/L. \par VII: Facial strength is normal B/L. \par VIII: Gross hearing is intact. \par IX, X: Palate is midline and elevates symmetrically. \par XI: Trapezius normal strength. \par XII: Tongue midline without atrophy or fasciculations. \par \par Motor exam \par Muscle tone - no evidence of rigidity or resistance in all 4 extremities. \par No atrophy or fasciculations \par Muscle Strength: arms and legs, proximal and distal flexors and extensors are normal \par No UE drift.\par \par Reflexes \par All present, normal, and symmetrical. \par Plantars right: mute. \par Plantars left: mute. \par Absent ankle jerks. \par \par Coordination \par Finger to nose: Normal. \par Heel to shin: Normal. \par \par Sensory\par impaired vibration\par \par Gait \par Normal\par

## 2023-03-08 NOTE — HISTORY OF PRESENT ILLNESS
[FreeTextEntry1] : Topamax worked very well initially.\par Increased number of headaches the last two months.\par \par UTI bacteremia - admitted to the hospital.\par Covid one month ago (Covid headache)\par \par Had a violent sneeze one month ago - then was unable to move neck - neck spasm - lasted two days- this is better now. \par \par Daily headaches at present (not as severe)\par no n, no v\par Similar character, less intense\par \par Stress level is high at present.\par 29 y./o daughter is undergoing Hodgkins - repeat PET does not show good news.\par \par \par 10/26/22\par This is a 58 y/o woman who is being seen in consultation for headaches.\par \par Finished chemotherapy for the Waldenstroms\par neuropathy better after treatment \par \par Notes daily headaches- these are not migraines\par unilateral or bilateral frontal \par occasional nausea but no vomiting (h/o gastric bypass)\par avoids Excedrin \par light and sound bother her \par needs to lay down \par ice helps - for severe ha Excedrin helps \par no auras - \par \par can wake up in the middle of the night with headache - not new \par \par Migraines tend to be much worse in severity - last one was \par had to go to ED \par cannot lift head\par needs black room \par has difficulty expressing self \par 5 years ago\par \par Finished menopause at age 43\par \par one melatonin at night (helps her sleep)\par \par Teaching nursing now.

## 2023-03-08 NOTE — ASSESSMENT
[FreeTextEntry1] : Increase Topiramate to 100 mg qhs.\par No side effects reported\par \par Side effects include, but are not limited to, decreased appetite, weight loss, numbness and tingling in the extremities, increased risk of kidney stones, word finding difficulties, and fatigue.\par \par Add Duloxetine - target tension and anxiety component.\par reviewed side effects\par \par 2 L water daily \par Ha diary\par \par Rescue \par Rizatriptan 10 mg ODT \par \par PT for neck pain and spasm.\par

## 2023-04-03 ENCOUNTER — RESULT REVIEW (OUTPATIENT)
Age: 58
End: 2023-04-03

## 2023-04-04 ENCOUNTER — APPOINTMENT (OUTPATIENT)
Dept: GASTROENTEROLOGY | Facility: HOSPITAL | Age: 58
End: 2023-04-04

## 2023-04-05 ENCOUNTER — APPOINTMENT (OUTPATIENT)
Dept: GASTROENTEROLOGY | Facility: CLINIC | Age: 58
End: 2023-04-05

## 2023-04-13 ENCOUNTER — NON-APPOINTMENT (OUTPATIENT)
Age: 58
End: 2023-04-13

## 2023-04-17 ENCOUNTER — APPOINTMENT (OUTPATIENT)
Dept: HEMATOLOGY ONCOLOGY | Facility: CLINIC | Age: 58
End: 2023-04-17
Payer: COMMERCIAL

## 2023-05-02 ENCOUNTER — RESULT REVIEW (OUTPATIENT)
Age: 58
End: 2023-05-02

## 2023-05-02 ENCOUNTER — APPOINTMENT (OUTPATIENT)
Dept: HEMATOLOGY ONCOLOGY | Facility: CLINIC | Age: 58
End: 2023-05-02
Payer: COMMERCIAL

## 2023-05-02 VITALS
HEIGHT: 72 IN | OXYGEN SATURATION: 96 % | TEMPERATURE: 96.2 F | HEART RATE: 73 BPM | WEIGHT: 228.44 LBS | SYSTOLIC BLOOD PRESSURE: 107 MMHG | BODY MASS INDEX: 30.94 KG/M2 | RESPIRATION RATE: 16 BRPM | DIASTOLIC BLOOD PRESSURE: 69 MMHG

## 2023-05-02 DIAGNOSIS — K58.2 MIXED IRRITABLE BOWEL SYNDROME: ICD-10-CM

## 2023-05-02 DIAGNOSIS — Z12.31 ENCOUNTER FOR SCREENING MAMMOGRAM FOR MALIGNANT NEOPLASM OF BREAST: ICD-10-CM

## 2023-05-02 PROCEDURE — 36415 COLL VENOUS BLD VENIPUNCTURE: CPT

## 2023-05-02 PROCEDURE — 99213 OFFICE O/P EST LOW 20 MIN: CPT | Mod: 25

## 2023-05-02 RX ORDER — CEFUROXIME AXETIL 500 MG/1
500 TABLET ORAL
Qty: 12 | Refills: 0 | Status: COMPLETED | COMMUNITY
Start: 2022-11-03 | End: 2023-05-02

## 2023-05-15 ENCOUNTER — RESULT REVIEW (OUTPATIENT)
Age: 58
End: 2023-05-15

## 2023-05-17 ENCOUNTER — APPOINTMENT (OUTPATIENT)
Dept: NEUROLOGY | Facility: CLINIC | Age: 58
End: 2023-05-17
Payer: COMMERCIAL

## 2023-05-17 ENCOUNTER — RESULT REVIEW (OUTPATIENT)
Age: 58
End: 2023-05-17

## 2023-05-17 VITALS
BODY MASS INDEX: 30.2 KG/M2 | HEIGHT: 72 IN | DIASTOLIC BLOOD PRESSURE: 79 MMHG | HEART RATE: 55 BPM | OXYGEN SATURATION: 98 % | TEMPERATURE: 97.9 F | WEIGHT: 223 LBS | SYSTOLIC BLOOD PRESSURE: 114 MMHG

## 2023-05-17 DIAGNOSIS — F41.9 ANXIETY DISORDER, UNSPECIFIED: ICD-10-CM

## 2023-05-17 DIAGNOSIS — G43.109 MIGRAINE WITH AURA, NOT INTRACTABLE, W/OUT STATUS MIGRAINOSUS: ICD-10-CM

## 2023-05-17 PROCEDURE — 99214 OFFICE O/P EST MOD 30 MIN: CPT

## 2023-05-17 RX ORDER — DULOXETINE HYDROCHLORIDE 30 MG/1
30 CAPSULE, DELAYED RELEASE PELLETS ORAL
Qty: 60 | Refills: 5 | Status: DISCONTINUED | COMMUNITY
Start: 2023-03-07 | End: 2023-05-17

## 2023-05-18 NOTE — ASSESSMENT
[FreeTextEntry1] : Continue Topiramate to 100 mg qhs.\par No side effects reported\par Side effects include, but are not limited to, decreased appetite, weight loss, numbness and tingling in the extremities, increased risk of kidney stones, word finding difficulties, and fatigue.\par \par Likely increased numbness is due to Topiramate. Will obtain emg/ncv again.\par \par Lexapro 10 mg (has been on it in the past)\par \par 2 L water daily \par Ha diary\par \par Rescue \par Rizatriptan 10 mg ODT \par \par \par

## 2023-05-18 NOTE — HISTORY OF PRESENT ILLNESS
[FreeTextEntry1] : did not tolerate duloxetine\par felt jittery\par \par increase in Topamax 85-90% ha resolution\par \par stress level much better\par daughter in remission !\par but still anxious\par no lateralizing deficits\par \par worsening numbness\par after increasing Topiramate \par \par \par 3/7/23\par Topamax worked very well initially.\par Increased number of headaches the last two months.\par \par UTI bacteremia - admitted to the hospital.\par Covid one month ago (Covid headache)\par \par Had a violent sneeze one month ago - then was unable to move neck - neck spasm - lasted two days- this is better now. \par \par Daily headaches at present (not as severe)\par no n, no v\par Similar character, less intense\par \par Stress level is high at present.\par 29 y./o daughter is undergoing Hodgkins - repeat PET does not show good news.\par \par \par 10/26/22\par This is a 58 y/o woman who is being seen in consultation for headaches.\par \par Finished chemotherapy for the Waldenstroms\par neuropathy better after treatment \par \par Notes daily headaches- these are not migraines\par unilateral or bilateral frontal \par occasional nausea but no vomiting (h/o gastric bypass)\par avoids Excedrin \par light and sound bother her \par needs to lay down \par ice helps - for severe ha Excedrin helps \par no auras - \par \par can wake up in the middle of the night with headache - not new \par \par Migraines tend to be much worse in severity - last one was \par had to go to ED \par cannot lift head\par needs black room \par has difficulty expressing self \par 5 years ago\par \par Finished menopause at age 43\par \par one melatonin at night (helps her sleep)\par \par Teaching nursing now.

## 2023-05-26 PROBLEM — Z12.31 BREAST CANCER SCREENING BY MAMMOGRAM: Status: ACTIVE | Noted: 2020-10-14

## 2023-05-26 PROBLEM — K58.2 IRRITABLE BOWEL SYNDROME WITH BOTH CONSTIPATION AND DIARRHEA: Status: ACTIVE | Noted: 2023-02-24

## 2023-05-26 NOTE — HISTORY OF PRESENT ILLNESS
[de-identified] : 52 year old female who is referred by Dr.Tobe Sanchez for initial consultation for newly diagnosed Waldenstrom macroglobulinemia.  \par She developed intermittent chest pressure and underwent laboratory testing which showed positive immunofixation for double clone of IgM.  \par She does not have the records but saw  underwent  .  She believes IgM is in the 5946-5032 range and the serum viscosity is slightly elevated.\par She denies weight loss, gum bleeding but has h/o chronic bruising, no neuropathy, imbalance or difficulty concentrating.  \par She has observed changes with her vision but didn’t see ophthalmologist.  \par She had h/o gastric bypass and has significant iron deficiency.   [FreeTextEntry1] : s/p IV iron [de-identified] : Patient presents today for follow up of WM having some neuropathy and fatigue.\par

## 2023-05-26 NOTE — REVIEW OF SYSTEMS
[Palpitations] : no palpitations [Muscle Pain] : no muscle pain [FreeTextEntry2] : improve [FreeTextEntry5] : intermittent  [FreeTextEntry9] : muscle cramping bilateral legs [de-identified] : neuropathy in her feet

## 2023-05-26 NOTE — ASSESSMENT
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  \par \par She presented to PCP with intermitted chest discomfort and was found to have elevated alkaline phosphatase and it was followed by immunofixation which showed biclonal IgM-kappa.  \par Review of labs in [facility] showed elevated ALP dating back to August 2006 with value between 120 to 250 with normal bilirubin. \par Neuropathy - worsening, toes to above ankles. Neurology evaluation. MAG negative. \par \par Viscosity 1.6 , IgM 345.\par Increased neuropathy - LE stocking distribution. Recent fall, sustained fracture left ankle. Obtain MRI. \par Consultation with Gerald Staples at [facility].  Biopsy of the nerve c/w neuropathy 2nd to IgM, increase level to above ankle.  Patient has indication for treatment with rituximab/ bendamustine. \par She wants to start the treatment in Jan 2021.\par Side effects and schema explained to patient. \par \par - bone marrow results reviewed with patient - 30% BM involvement with MYD 88 mutation present 3/2018\par 11/2020- 6/2021- Bendamustine/ rituximab ( interrupted b/o COVID) \par \par - IgM decreased to 347, neuropathy slightly more intense- repeat IgM today \par - patient immunocompromised, h/o Covid, high risk, offered Evusheld.  Side effects explained. S/p COVID infection April 2022\par \par MAG- negative \par Muscle cramping - stretching exercises \par \par S/p gastric bypass, increased weight \par - s/p IV iron October 2021\par - Zinc  and Copper WNL\par \par Osteoporosis - 2.5.  \par last bone density 11/2021\par T-score -2.6\par Risk factors: postmenopausal, Waldenstrom macroglobulinemia. \par Recommended:\par 1. Vitamin D\par 2. Calcium supplement 500mg\par 3. Weight bearing exercises\par 4. Prolia 6/18, 12/18, Oct 2019-, June 2020, April 2022, 10/22, 5/23\par \par vit D level- 24 Dec 2019 \par - start 50 k vit D weekly\par - repeat level today \par \par HA \par - neurology evaluation - increased Topamax- did not tolerate cymbalta \par Vitamin B12 \par - resume SQ\par \par Labs ordered, drawn today- reviewed will repeat PET CT now \par \par cbc, chem  Immunoglobulins, Waldenstrom panel-\par B12 , ferritin, zinc copper, return in 3 months \par \par

## 2023-05-26 NOTE — CONSULT LETTER
[FreeTextEntry3] : Adilia Mireles MD\par [facility] [place] at [facility]\par

## 2023-07-25 ENCOUNTER — RESULT REVIEW (OUTPATIENT)
Age: 58
End: 2023-07-25

## 2023-07-25 ENCOUNTER — APPOINTMENT (OUTPATIENT)
Dept: HEMATOLOGY ONCOLOGY | Facility: CLINIC | Age: 58
End: 2023-07-25
Payer: COMMERCIAL

## 2023-07-25 VITALS
WEIGHT: 225.3 LBS | HEART RATE: 70 BPM | TEMPERATURE: 97.7 F | BODY MASS INDEX: 30.51 KG/M2 | OXYGEN SATURATION: 96 % | HEIGHT: 72 IN | RESPIRATION RATE: 16 BRPM | DIASTOLIC BLOOD PRESSURE: 62 MMHG | SYSTOLIC BLOOD PRESSURE: 100 MMHG

## 2023-07-25 DIAGNOSIS — F41.8 OTHER SPECIFIED ANXIETY DISORDERS: ICD-10-CM

## 2023-07-25 PROCEDURE — 36415 COLL VENOUS BLD VENIPUNCTURE: CPT

## 2023-07-25 PROCEDURE — 99214 OFFICE O/P EST MOD 30 MIN: CPT | Mod: 25

## 2023-07-25 RX ORDER — IBUPROFEN 600 MG/1
600 TABLET, FILM COATED ORAL
Qty: 30 | Refills: 0 | Status: COMPLETED | COMMUNITY
Start: 2022-10-29 | End: 2023-07-25

## 2023-07-25 NOTE — ASSESSMENT
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  \par  \par Neuropathy - worsening, toes to above ankles. Neurology evaluation. MAG negative. \par \par Viscosity 1.6 , IgM 345.\par Increased neuropathy - LE stocking distribution. Recent fall, sustained fracture left ankle. Obtain MRI. \par Consultation with Gerald Staples at [facility].  Biopsy of the nerve c/w neuropathy 2nd to IgM, increase level to above ankle.  Patient has indication for treatment with rituximab/ bendamustine. \par -Oz/rituximab x6 cycles March 2021–September 2021\par - bone marrow results reviewed with patient - 30% BM involvement with MYD 88 mutation present 3/2018\par -PET/CT 5/22 reviewed images and report revealed skin thickening which was related to the insect bite.  Patient has small lymph nodes FDG avid undergoing surveillance.  She has increased uptake in the distal esophagus and transverse colon–she underwent EGD and colonoscopy.\par \par Polyneuropathy- IgM decreased to 347, neuropathy slightly more intense- repeat IgM today \par - patient immunocompromised, h/o Covid, high risk, offered Evusheld.  Side effects explained. S/p COVID infection April 2022\par \par MAG- negative \par Muscle cramping - stretching exercises \par \par S/p gastric bypass, increased weight \par - s/p IV iron October 2021\par - Zinc  and Copper WNL\par \par Osteoporosis - 2.5.  \par last bone density 11/2021\par T-score -2.6\par Risk factors: postmenopausal, Waldenstrom macroglobulinemia. \par Recommended:\par 1. Vitamin D\par 2. Calcium supplement 500mg\par 3. Weight bearing exercises\par 4. Prolia 6/18, 12/18, Oct 2019-, June 2020, April 2022, 10/22, 5/23\par \par vit D level- 24 Dec 2019 \par - start 50 k vit D weekly\par - repeat level today \par \par HA \par - neurology evaluation - increased Topamax- did not tolerate cymbalta \par \par Vitamin B12 \par - resume SQ\par \par Labs ordered, drawn today- reviewed will repeat PET CT now \par \par # Mucosal bleeding - increased with dental procedures.\par \par cbc, chem  Immunoglobulins, Waldenstrom panel-\par B12 , ferritin, zinc copper, return in 3 months \par \par

## 2023-07-25 NOTE — CONSULT LETTER
[FreeTextEntry3] : Adilia Mireles MD\par [facility] [place] at [facility]\par

## 2023-07-25 NOTE — HISTORY OF PRESENT ILLNESS
[de-identified] : 52 year old female who is referred by Dr.Tobe Sanchez for initial consultation for newly diagnosed Waldenstrom macroglobulinemia.  \par She developed intermittent chest pressure and underwent laboratory testing which showed positive immunofixation for double clone of IgM.  \par She does not have the records but saw  underwent  .  She believes IgM is in the 4137-9390 range and the serum viscosity is slightly elevated.\par She denies weight loss, gum bleeding but has h/o chronic bruising, no neuropathy, imbalance or difficulty concentrating.  \par She has observed changes with her vision but didn’t see ophthalmologist.  \par She had h/o gastric bypass and has significant iron deficiency.   [FreeTextEntry1] : s/p IV iron [de-identified] : Patient presents today for follow up. Neuropathy to b/l lower extremities increased\par Dr Rome ordering EMG for further eval - think it may be from topomax \par

## 2023-07-25 NOTE — REVIEW OF SYSTEMS
[Palpitations] : no palpitations [Muscle Pain] : no muscle pain [FreeTextEntry2] : improve [FreeTextEntry5] : intermittent / neuropathy  [FreeTextEntry9] : muscle cramping bilateral legs [de-identified] : neuropathy in her feet

## 2023-08-17 NOTE — OB HISTORY
Risk Assessment Explanation (Does Not Render In The Note): Clinical determination of the probability and/or consequences of an event, such as surgery. Clinical assessment of the level of risk is affected by the nature of the event under consideration for the patient. Modifier 57 is used to indicate an Evaluation and Management (E/M) service resulted in the initial decision to perform surgery either the day before a major surgery (90 day global) or the day of a major surgery. Complexity (Necessary For Coding; Major - 90 Day Global With Some Exceptions; Minor - 10 Day Global): minor Identified Risk Factors Documented?: yes [___] : Living: [unfilled]

## 2023-11-14 ENCOUNTER — RESULT REVIEW (OUTPATIENT)
Age: 58
End: 2023-11-14

## 2023-11-14 ENCOUNTER — APPOINTMENT (OUTPATIENT)
Dept: HEMATOLOGY ONCOLOGY | Facility: CLINIC | Age: 58
End: 2023-11-14
Payer: COMMERCIAL

## 2023-11-14 VITALS
OXYGEN SATURATION: 97 % | TEMPERATURE: 97.5 F | DIASTOLIC BLOOD PRESSURE: 63 MMHG | BODY MASS INDEX: 32.39 KG/M2 | WEIGHT: 239.13 LBS | SYSTOLIC BLOOD PRESSURE: 117 MMHG | RESPIRATION RATE: 17 BRPM | HEART RATE: 68 BPM | HEIGHT: 72 IN

## 2023-11-14 DIAGNOSIS — E55.9 VITAMIN D DEFICIENCY, UNSPECIFIED: ICD-10-CM

## 2023-11-14 PROCEDURE — 36415 COLL VENOUS BLD VENIPUNCTURE: CPT

## 2023-11-14 PROCEDURE — 99214 OFFICE O/P EST MOD 30 MIN: CPT | Mod: 25

## 2023-11-14 RX ORDER — VALACYCLOVIR 1 G/1
1 TABLET, FILM COATED ORAL
Qty: 90 | Refills: 3 | Status: ACTIVE | COMMUNITY
Start: 2020-11-23 | End: 1900-01-01

## 2023-11-14 RX ORDER — CYANOCOBALAMIN 1000 UG/ML
1000 INJECTION INTRAMUSCULAR; SUBCUTANEOUS
Qty: 3 | Refills: 3 | Status: ACTIVE | COMMUNITY
Start: 1900-01-01 | End: 1900-01-01

## 2023-11-14 RX ORDER — SYRINGE WITH NEEDLE, 1 ML 25GX5/8"
25G X 5/8" SYRINGE, EMPTY DISPOSABLE MISCELLANEOUS
Qty: 12 | Refills: 0 | Status: ACTIVE | COMMUNITY
Start: 2021-03-05 | End: 1900-01-01

## 2023-11-15 ENCOUNTER — APPOINTMENT (OUTPATIENT)
Dept: NEUROLOGY | Facility: CLINIC | Age: 58
End: 2023-11-15
Payer: COMMERCIAL

## 2023-11-17 ENCOUNTER — APPOINTMENT (OUTPATIENT)
Dept: NEUROLOGY | Facility: CLINIC | Age: 58
End: 2023-11-17
Payer: COMMERCIAL

## 2023-11-17 DIAGNOSIS — G62.89 OTHER SPECIFIED POLYNEUROPATHIES: ICD-10-CM

## 2023-11-17 PROCEDURE — 95910 NRV CNDJ TEST 7-8 STUDIES: CPT

## 2023-11-22 ENCOUNTER — RESULT REVIEW (OUTPATIENT)
Age: 58
End: 2023-11-22

## 2023-11-22 DIAGNOSIS — M79.661 PAIN IN RIGHT LOWER LEG: ICD-10-CM

## 2023-11-22 PROBLEM — G62.89 OTHER POLYNEUROPATHY: Status: ACTIVE | Noted: 2019-09-12

## 2024-01-26 ENCOUNTER — TRANSCRIPTION ENCOUNTER (OUTPATIENT)
Age: 59
End: 2024-01-26

## 2024-01-30 ENCOUNTER — NON-APPOINTMENT (OUTPATIENT)
Age: 59
End: 2024-01-30

## 2024-02-26 ENCOUNTER — RESULT REVIEW (OUTPATIENT)
Age: 59
End: 2024-02-26

## 2024-02-26 ENCOUNTER — APPOINTMENT (OUTPATIENT)
Dept: HEMATOLOGY ONCOLOGY | Facility: CLINIC | Age: 59
End: 2024-02-26
Payer: COMMERCIAL

## 2024-02-26 VITALS
HEIGHT: 72 IN | SYSTOLIC BLOOD PRESSURE: 112 MMHG | TEMPERATURE: 98.1 F | WEIGHT: 229 LBS | HEART RATE: 75 BPM | DIASTOLIC BLOOD PRESSURE: 59 MMHG | RESPIRATION RATE: 16 BRPM | OXYGEN SATURATION: 99 % | BODY MASS INDEX: 31.02 KG/M2

## 2024-02-26 DIAGNOSIS — M81.0 AGE-RELATED OSTEOPOROSIS W/OUT CURRENT PATHOLOGICAL FRACTURE: ICD-10-CM

## 2024-02-26 PROCEDURE — 99214 OFFICE O/P EST MOD 30 MIN: CPT

## 2024-02-26 PROCEDURE — G2211 COMPLEX E/M VISIT ADD ON: CPT

## 2024-02-26 PROCEDURE — 36415 COLL VENOUS BLD VENIPUNCTURE: CPT

## 2024-02-26 NOTE — HISTORY OF PRESENT ILLNESS
[de-identified] : 52 year old female who is referred by Dr.Tobe Sanchez for initial consultation for newly diagnosed Waldenstrom macroglobulinemia.  \par  She developed intermittent chest pressure and underwent laboratory testing which showed positive immunofixation for double clone of IgM.  \par  She does not have the records but saw  underwent  .  She believes IgM is in the 9224-2121 range and the serum viscosity is slightly elevated.\par  She denies weight loss, gum bleeding but has h/o chronic bruising, no neuropathy, imbalance or difficulty concentrating.  \par  She has observed changes with her vision but didn't see ophthalmologist.  \par  She had h/o gastric bypass and has significant iron deficiency.   [FreeTextEntry1] : s/p IV iron [de-identified] : Patient presents today for follow up.  Overall feeling well  4 weeks post op Right TKR - on Eliquis 2.5mg for 6 weeks  Pt anxious during recovery due to unmanaged pain  Doing PT, going to gym, riding bike

## 2024-02-26 NOTE — CONSULT LETTER
[Dear  ___] : Dear  [unfilled], [Consult Letter:] : I had the pleasure of evaluating your patient, [unfilled]. [Please see my note below.] : Please see my note below. [Sincerely,] : Sincerely, [FreeTextEntry3] : Adilia Mireles MD\par  [facility] [place] at [facility]\par

## 2024-02-26 NOTE — ASSESSMENT
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 Neuropathy - worsening, toes to above ankles. Neurology evaluation. MAG negative.  Viscosity 1.6 , IgM 345. Increased neuropathy - LE stocking distribution. Recent fall, sustained fracture left ankle. Obtain MRI. Consultation with Gerald Staples at [facility]. Biopsy of the nerve c/w neuropathy 2nd to IgM, increase level to above ankle. Patient has indication for treatment with rituximab/ bendamustine. -Oz/rituximab x6 cycles March 2021-September 2021 - bone marrow results reviewed with patient - 30% BM involvement with MYD 88 mutation present 3/2018 -PET/CT 5/22 reviewed images and report revealed skin thickening which was related to the insect bite. Patient has small lymph nodes FDG avid undergoing surveillance. She has increased uptake in the distal esophagus and transverse colon-she underwent EGD and colonoscopy. - PETCT 5/23  #Polyneuropathy- IgM decreased to 347, neuropathy slightly more intense LE, worse on EMG.   #patient immunocompromised, h/o Covid, high risk. Side effects explained. S/p COVID infection April 2022 MAG- negative B12 - injections monthly #Muscle cramping - stretching exercises  #S/p gastric bypass, increased weight - s/p IV iron October 2021 - Zinc and Copper WNL- repeat next visit   Osteoporosis - 2.5. last bone density 11/2021 T-score -2.6 Risk factors: postmenopausal, Waldenstrom macroglobulinemia. Recommended: 1. Vitamin D 2. Calcium supplement 500mg 3. Weight bearing exercises 4. Prolia 6/18, 12/18, Oct 2019-, June 2020, April 2022, 10/22, 5/23, ( 8) 11/2023, (9)- due in 2024   Undergoing dental care  vit D level- 24 Dec 2019 - start 50 k vit D weekly - repeat level today  HA - neurology evaluation - increased Topamax- did not tolerate cymbalta  Vitamin B12 - resume SQ  # Mucosal bleeding - increased with dental procedures.  # Planned for knee replacement - cleared from hematology perspective - Eliquis DVT prophylaxis  # left knee pain - s/p replacement   cbc, chem Immunoglobulins, Waldenstrom panel B12 , ferritin, zinc copper, return in 3 months  # Zinc / Copper - check next visit 6/ 2024

## 2024-02-26 NOTE — REVIEW OF SYSTEMS
[Lower Ext Edema] : lower extremity edema [Negative] : Allergic/Immunologic [Muscle Pain] : no muscle pain [Palpitations] : no palpitations [FreeTextEntry2] : improve [FreeTextEntry5] : intermittent / neuropathy  [FreeTextEntry9] : muscle cramping bilateral legs [de-identified] : neuropathy in her feet

## 2024-02-26 NOTE — ADDENDUM
[FreeTextEntry1] : Shingles vaccine given 0.5 cc to left deltoid lot number [id number] expiration date 10/29/2021 normal for race

## 2024-02-26 NOTE — PHYSICAL EXAM
[Fully active, able to carry on all pre-disease performance without restriction] : Status 0 - Fully active, able to carry on all pre-disease performance without restriction [Normal] : affect appropriate [de-identified] : LE edema - erthema

## 2024-03-24 ENCOUNTER — NON-APPOINTMENT (OUTPATIENT)
Age: 59
End: 2024-03-24

## 2024-03-26 ENCOUNTER — APPOINTMENT (OUTPATIENT)
Dept: NEUROLOGY | Facility: CLINIC | Age: 59
End: 2024-03-26

## 2024-04-08 ENCOUNTER — NON-APPOINTMENT (OUTPATIENT)
Age: 59
End: 2024-04-08

## 2024-04-09 ENCOUNTER — RESULT REVIEW (OUTPATIENT)
Age: 59
End: 2024-04-09

## 2024-05-20 ENCOUNTER — APPOINTMENT (OUTPATIENT)
Dept: HEMATOLOGY ONCOLOGY | Facility: CLINIC | Age: 59
End: 2024-05-20
Payer: COMMERCIAL

## 2024-05-20 ENCOUNTER — RESULT REVIEW (OUTPATIENT)
Age: 59
End: 2024-05-20

## 2024-05-20 VITALS
HEIGHT: 72 IN | HEART RATE: 71 BPM | BODY MASS INDEX: 30.88 KG/M2 | SYSTOLIC BLOOD PRESSURE: 117 MMHG | OXYGEN SATURATION: 98 % | DIASTOLIC BLOOD PRESSURE: 62 MMHG | RESPIRATION RATE: 16 BRPM | WEIGHT: 228 LBS | TEMPERATURE: 97.8 F

## 2024-05-20 DIAGNOSIS — G43.909 MIGRAINE, UNSPECIFIED, NOT INTRACTABLE, W/OUT STATUS MIGRAINOSUS: ICD-10-CM

## 2024-05-20 DIAGNOSIS — R79.89 OTHER SPECIFIED ABNORMAL FINDINGS OF BLOOD CHEMISTRY: ICD-10-CM

## 2024-05-20 DIAGNOSIS — C88.0 WALDENSTROM MACROGLOBULINEMIA: ICD-10-CM

## 2024-05-20 DIAGNOSIS — D84.9 IMMUNODEFICIENCY, UNSPECIFIED: ICD-10-CM

## 2024-05-20 DIAGNOSIS — G62.9 POLYNEUROPATHY, UNSPECIFIED: ICD-10-CM

## 2024-05-20 PROCEDURE — 36415 COLL VENOUS BLD VENIPUNCTURE: CPT

## 2024-05-20 PROCEDURE — 99214 OFFICE O/P EST MOD 30 MIN: CPT

## 2024-05-20 RX ORDER — APIXABAN 2.5 MG/1
2.5 TABLET, FILM COATED ORAL TWICE DAILY
Qty: 60 | Refills: 0 | Status: COMPLETED | COMMUNITY
End: 2024-05-20

## 2024-05-20 RX ORDER — ESCITALOPRAM OXALATE 10 MG/1
10 TABLET ORAL
Qty: 90 | Refills: 1 | Status: COMPLETED | COMMUNITY
Start: 2023-05-17 | End: 2024-05-20

## 2024-05-20 RX ORDER — TOPIRAMATE 100 MG/1
100 TABLET, FILM COATED ORAL
Qty: 90 | Refills: 1 | Status: COMPLETED | COMMUNITY
Start: 2022-10-26 | End: 2024-05-20

## 2024-05-20 RX ORDER — TOPIRAMATE 50 MG/1
50 TABLET, FILM COATED ORAL
Refills: 0 | Status: ACTIVE | COMMUNITY

## 2024-05-20 NOTE — ASSESSMENT
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 Neuropathy - worsening, toes to above ankles. Neurology evaluation. MAG negative.  Viscosity 1.6 , IgM 345. Increased neuropathy - LE stocking distribution. Recent fall, sustained fracture left ankle. Obtain MRI. Consultation with Gerald Staples at [facility]. Biopsy of the nerve c/w neuropathy 2nd to IgM, increase level to above ankle. Patient has indication for treatment with rituximab/ bendamustine. -Oz/rituximab x6 cycles March 2021-September 2021 - bone marrow results reviewed with patient - 30% BM involvement with MYD 88 mutation present 3/2018 -PET/CT 5/22 reviewed images and report revealed skin thickening which was related to the insect bite. Patient has small lymph nodes FDG avid undergoing surveillance. She has increased uptake in the distal esophagus and transverse colon-she underwent EGD and colonoscopy. - PETCT 5/23  #Polyneuropathy- IgM decreased to 347, neuropathy slightly more intense LE, worse on EMG.   #patient immunocompromised, h/o Covid, high risk. Side effects explained. S/p COVID infection April 2022 MAG- negative B12 - injections monthly #Muscle cramping - stretching exercises  #S/p gastric bypass, increased weight - s/p IV iron October 2021 - Zinc and Copper WNL- repeat next visit   #Osteoporosis - 2.5. last bone density 11/2021 T-score -2.6 Risk factors: postmenopausal, Waldenstrom macroglobulinemia. Recommended: 1. Vitamin D 2. Calcium supplement 500mg 3. Weight bearing exercises 4. Prolia 6/18, 12/18, Oct 2019-, June 2020, April 2022, 10/22, 5/23, ( 8) 11/2023, (9)- due in 2024, 5/24 Undergoing dental care  vit D level- 24 Dec 2019 - start 50 k vit D weekly - repeat level today  HA - neurology evaluation - increased Topamax- did not tolerate cymbalta  Vitamin B12 - resume SQ  # Mucosal bleeding - increased with dental procedures.  # Planned for knee replacement - cleared from hematology perspective - Eliquis DVT prophylaxis  # left knee pain - s/p replacement   cbc, chem Immunoglobulins, Waldenstrom panel B12 , ferritin, zinc copper, return in 3 months  # Zinc / Copper - check next visit 6/ 2024

## 2024-05-20 NOTE — REVIEW OF SYSTEMS
[Lower Ext Edema] : lower extremity edema [Negative] : Allergic/Immunologic [Palpitations] : no palpitations [Muscle Pain] : no muscle pain [FreeTextEntry2] : improve [FreeTextEntry5] : intermittent / neuropathy  [FreeTextEntry9] : muscle cramping bilateral legs [de-identified] : neuropathy in her feet

## 2024-05-20 NOTE — PHYSICAL EXAM
[Fully active, able to carry on all pre-disease performance without restriction] : Status 0 - Fully active, able to carry on all pre-disease performance without restriction [Normal] : affect appropriate [de-identified] : LE edema - erthema

## 2024-05-20 NOTE — HISTORY OF PRESENT ILLNESS
[de-identified] : 52 year old female who is referred by Dr.Tobe Sanchez for initial consultation for newly diagnosed Waldenstrom macroglobulinemia.  \par  She developed intermittent chest pressure and underwent laboratory testing which showed positive immunofixation for double clone of IgM.  \par  She does not have the records but saw  underwent  .  She believes IgM is in the 0813-5088 range and the serum viscosity is slightly elevated.\par  She denies weight loss, gum bleeding but has h/o chronic bruising, no neuropathy, imbalance or difficulty concentrating.  \par  She has observed changes with her vision but didn't see ophthalmologist.  \par  She had h/o gastric bypass and has significant iron deficiency.   [FreeTextEntry1] : s/p IV iron [de-identified] : Patient presents today for follow up. Patient overall feels well except her right knee feels stiff and her neuropathy is worse; she just had a right knee replacement in Jan, 2024. She had a follow up nerve test which she sates got worse.  She still is undergoing PT.  She had a DEXA scan on April 09, 2024; last mammogram May 2023.  Denies any fever, chills or shortness of breath. She had the flu A in march 2024.

## 2024-05-20 NOTE — CONSULT LETTER
[Dear  ___] : Dear  [unfilled], [Consult Letter:] : I had the pleasure of evaluating your patient, [unfilled]. [Please see my note below.] : Please see my note below. [Sincerely,] : Sincerely, [FreeTextEntry3] : Adilia Mireles MD\par  [facility] [place] at [facility]\par

## 2024-09-03 ENCOUNTER — APPOINTMENT (OUTPATIENT)
Dept: NEUROLOGY | Facility: CLINIC | Age: 59
End: 2024-09-03

## 2024-11-26 ENCOUNTER — RESULT REVIEW (OUTPATIENT)
Age: 59
End: 2024-11-26

## 2024-11-26 ENCOUNTER — APPOINTMENT (OUTPATIENT)
Dept: HEMATOLOGY ONCOLOGY | Facility: CLINIC | Age: 59
End: 2024-11-26
Payer: COMMERCIAL

## 2024-11-26 VITALS
RESPIRATION RATE: 16 BRPM | BODY MASS INDEX: 28.9 KG/M2 | DIASTOLIC BLOOD PRESSURE: 69 MMHG | OXYGEN SATURATION: 99 % | SYSTOLIC BLOOD PRESSURE: 124 MMHG | HEIGHT: 72 IN | WEIGHT: 213.38 LBS | HEART RATE: 65 BPM | TEMPERATURE: 97.9 F

## 2024-11-26 DIAGNOSIS — G62.9 POLYNEUROPATHY, UNSPECIFIED: ICD-10-CM

## 2024-11-26 DIAGNOSIS — D84.9 IMMUNODEFICIENCY, UNSPECIFIED: ICD-10-CM

## 2024-11-26 DIAGNOSIS — D86.0 SARCOIDOSIS OF LUNG: ICD-10-CM

## 2024-11-26 DIAGNOSIS — M81.0 AGE-RELATED OSTEOPOROSIS W/OUT CURRENT PATHOLOGICAL FRACTURE: ICD-10-CM

## 2024-11-26 DIAGNOSIS — C88.00 WALDENSTROM MACROGLOBULINEMIA NOT HAVING ACHIEVED REMISSION: ICD-10-CM

## 2024-11-26 PROCEDURE — 36415 COLL VENOUS BLD VENIPUNCTURE: CPT

## 2024-11-26 PROCEDURE — 99213 OFFICE O/P EST LOW 20 MIN: CPT

## 2025-03-25 ENCOUNTER — RESULT REVIEW (OUTPATIENT)
Age: 60
End: 2025-03-25

## 2025-05-17 ENCOUNTER — NON-APPOINTMENT (OUTPATIENT)
Age: 60
End: 2025-05-17

## 2025-05-19 ENCOUNTER — RESULT REVIEW (OUTPATIENT)
Age: 60
End: 2025-05-19

## 2025-05-19 ENCOUNTER — APPOINTMENT (OUTPATIENT)
Dept: HEMATOLOGY ONCOLOGY | Facility: CLINIC | Age: 60
End: 2025-05-19
Payer: COMMERCIAL

## 2025-05-19 VITALS
SYSTOLIC BLOOD PRESSURE: 108 MMHG | TEMPERATURE: 96.9 F | HEART RATE: 73 BPM | WEIGHT: 188.19 LBS | RESPIRATION RATE: 16 BRPM | OXYGEN SATURATION: 97 % | BODY MASS INDEX: 25.49 KG/M2 | DIASTOLIC BLOOD PRESSURE: 62 MMHG | HEIGHT: 72 IN

## 2025-05-19 DIAGNOSIS — G62.9 POLYNEUROPATHY, UNSPECIFIED: ICD-10-CM

## 2025-05-19 DIAGNOSIS — M81.0 AGE-RELATED OSTEOPOROSIS W/OUT CURRENT PATHOLOGICAL FRACTURE: ICD-10-CM

## 2025-05-19 DIAGNOSIS — D86.0 SARCOIDOSIS OF LUNG: ICD-10-CM

## 2025-05-19 DIAGNOSIS — C88.00 WALDENSTROM MACROGLOBULINEMIA NOT HAVING ACHIEVED REMISSION: ICD-10-CM

## 2025-05-19 PROCEDURE — 36415 COLL VENOUS BLD VENIPUNCTURE: CPT

## 2025-05-19 PROCEDURE — 99214 OFFICE O/P EST MOD 30 MIN: CPT

## 2025-05-19 RX ORDER — TIRZEPATIDE 5 MG/.5ML
5 INJECTION, SOLUTION SUBCUTANEOUS
Refills: 0 | Status: ACTIVE | COMMUNITY